# Patient Record
Sex: FEMALE | Race: WHITE | NOT HISPANIC OR LATINO | Employment: UNEMPLOYED | URBAN - METROPOLITAN AREA
[De-identification: names, ages, dates, MRNs, and addresses within clinical notes are randomized per-mention and may not be internally consistent; named-entity substitution may affect disease eponyms.]

---

## 2020-02-10 ENCOUNTER — HOSPITAL ENCOUNTER (INPATIENT)
Facility: HOSPITAL | Age: 45
LOS: 2 days | Discharge: HOME | DRG: 389 | End: 2020-02-12
Attending: FAMILY MEDICINE | Admitting: INTERNAL MEDICINE
Payer: COMMERCIAL

## 2020-02-10 DIAGNOSIS — K56.609 SBO (SMALL BOWEL OBSTRUCTION): Primary | ICD-10-CM

## 2020-02-10 DIAGNOSIS — K56.7 ILEUS: ICD-10-CM

## 2020-02-10 DIAGNOSIS — K52.9 GASTROENTERITIS, INFECTIOUS, PRESUMED: ICD-10-CM

## 2020-02-10 PROBLEM — E87.6 HYPOKALEMIA: Status: ACTIVE | Noted: 2020-02-10

## 2020-02-10 PROBLEM — G43.909 MIGRAINE HEADACHE: Status: ACTIVE | Noted: 2020-02-10

## 2020-02-10 PROBLEM — R00.0 TACHYCARDIA: Status: ACTIVE | Noted: 2020-02-10

## 2020-02-10 PROBLEM — G47.00 INSOMNIA: Status: ACTIVE | Noted: 2020-02-10

## 2020-02-10 PROBLEM — E78.5 HYPERLIPIDEMIA: Status: ACTIVE | Noted: 2020-02-10

## 2020-02-10 LAB
ALBUMIN SERPL BCP-MCNC: 4.5 G/DL (ref 3.5–5.2)
ALP SERPL-CCNC: 61 U/L (ref 55–135)
ALT SERPL W/O P-5'-P-CCNC: 17 U/L (ref 10–44)
ANION GAP SERPL CALC-SCNC: 10 MMOL/L (ref 8–16)
AST SERPL-CCNC: 17 U/L (ref 10–40)
B-HCG UR QL: NEGATIVE
BACTERIA #/AREA URNS HPF: ABNORMAL /HPF
BASOPHILS # BLD AUTO: 0.05 K/UL (ref 0–0.2)
BASOPHILS NFR BLD: 0.2 % (ref 0–1.9)
BILIRUB SERPL-MCNC: 0.9 MG/DL (ref 0.1–1)
BILIRUB UR QL STRIP: ABNORMAL
BUN SERPL-MCNC: 15 MG/DL (ref 6–20)
CALCIUM SERPL-MCNC: 8.9 MG/DL (ref 8.7–10.5)
CHLORIDE SERPL-SCNC: 108 MMOL/L (ref 95–110)
CLARITY UR: CLEAR
CO2 SERPL-SCNC: 19 MMOL/L (ref 23–29)
COLOR UR: YELLOW
CREAT SERPL-MCNC: 0.7 MG/DL (ref 0.5–1.4)
DIFFERENTIAL METHOD: ABNORMAL
EOSINOPHIL # BLD AUTO: 0.2 K/UL (ref 0–0.5)
EOSINOPHIL NFR BLD: 0.6 % (ref 0–8)
ERYTHROCYTE [DISTWIDTH] IN BLOOD BY AUTOMATED COUNT: 12.8 % (ref 11.5–14.5)
EST. GFR  (AFRICAN AMERICAN): >60 ML/MIN/1.73 M^2
EST. GFR  (NON AFRICAN AMERICAN): >60 ML/MIN/1.73 M^2
GLUCOSE SERPL-MCNC: 107 MG/DL (ref 70–110)
GLUCOSE UR QL STRIP: NEGATIVE
HCT VFR BLD AUTO: 51.3 % (ref 37–48.5)
HGB BLD-MCNC: 17.4 G/DL (ref 12–16)
HGB UR QL STRIP: ABNORMAL
HYALINE CASTS #/AREA URNS LPF: 0 /LPF
IMM GRANULOCYTES # BLD AUTO: 0.13 K/UL (ref 0–0.04)
IMM GRANULOCYTES NFR BLD AUTO: 0.6 % (ref 0–0.5)
KETONES UR QL STRIP: ABNORMAL
LACTATE SERPL-SCNC: 0.7 MMOL/L (ref 0.5–2.2)
LEUKOCYTE ESTERASE UR QL STRIP: NEGATIVE
LIPASE SERPL-CCNC: 24 U/L (ref 4–60)
LYMPHOCYTES # BLD AUTO: 2.6 K/UL (ref 1–4.8)
LYMPHOCYTES NFR BLD: 11.1 % (ref 18–48)
MCH RBC QN AUTO: 30.7 PG (ref 27–31)
MCHC RBC AUTO-ENTMCNC: 33.9 G/DL (ref 32–36)
MCV RBC AUTO: 91 FL (ref 82–98)
MICROSCOPIC COMMENT: ABNORMAL
MONOCYTES # BLD AUTO: 1.6 K/UL (ref 0.3–1)
MONOCYTES NFR BLD: 6.7 % (ref 4–15)
NEUTROPHILS # BLD AUTO: 18.9 K/UL (ref 1.8–7.7)
NEUTROPHILS NFR BLD: 80.8 % (ref 38–73)
NITRITE UR QL STRIP: NEGATIVE
NRBC BLD-RTO: 0 /100 WBC
OTHER ELEMENTS URNS MICRO: ABNORMAL
PH UR STRIP: 6 [PH] (ref 5–8)
PLATELET # BLD AUTO: 265 K/UL (ref 150–350)
PMV BLD AUTO: 10 FL (ref 9.2–12.9)
POTASSIUM SERPL-SCNC: 3.4 MMOL/L (ref 3.5–5.1)
PROT SERPL-MCNC: 7.3 G/DL (ref 6–8.4)
PROT UR QL STRIP: ABNORMAL
RBC # BLD AUTO: 5.67 M/UL (ref 4–5.4)
RBC #/AREA URNS HPF: 6 /HPF (ref 0–4)
SODIUM SERPL-SCNC: 137 MMOL/L (ref 136–145)
SP GR UR STRIP: >=1.03 (ref 1–1.03)
URN SPEC COLLECT METH UR: ABNORMAL
UROBILINOGEN UR STRIP-ACNC: NEGATIVE EU/DL
WBC # BLD AUTO: 23.34 K/UL (ref 3.9–12.7)
WBC #/AREA URNS HPF: 4 /HPF (ref 0–5)

## 2020-02-10 PROCEDURE — 96375 TX/PRO/DX INJ NEW DRUG ADDON: CPT

## 2020-02-10 PROCEDURE — 74177 CT ABD & PELVIS W/CONTRAST: CPT | Mod: TC

## 2020-02-10 PROCEDURE — 71045 X-RAY EXAM CHEST 1 VIEW: CPT | Mod: 26,,, | Performed by: RADIOLOGY

## 2020-02-10 PROCEDURE — 83690 ASSAY OF LIPASE: CPT

## 2020-02-10 PROCEDURE — 96376 TX/PRO/DX INJ SAME DRUG ADON: CPT

## 2020-02-10 PROCEDURE — 80053 COMPREHEN METABOLIC PANEL: CPT

## 2020-02-10 PROCEDURE — 96374 THER/PROPH/DIAG INJ IV PUSH: CPT

## 2020-02-10 PROCEDURE — 81000 URINALYSIS NONAUTO W/SCOPE: CPT

## 2020-02-10 PROCEDURE — 74177 CT ABD & PELVIS W/CONTRAST: CPT | Mod: 26,,, | Performed by: RADIOLOGY

## 2020-02-10 PROCEDURE — 25500020 PHARM REV CODE 255: Performed by: FAMILY MEDICINE

## 2020-02-10 PROCEDURE — 11000001 HC ACUTE MED/SURG PRIVATE ROOM

## 2020-02-10 PROCEDURE — 96361 HYDRATE IV INFUSION ADD-ON: CPT

## 2020-02-10 PROCEDURE — 71045 XR CHEST 1 VIEW FOR LINE/TUBE PLACEMENT: ICD-10-PCS | Mod: 26,,, | Performed by: RADIOLOGY

## 2020-02-10 PROCEDURE — 87040 BLOOD CULTURE FOR BACTERIA: CPT

## 2020-02-10 PROCEDURE — 83605 ASSAY OF LACTIC ACID: CPT

## 2020-02-10 PROCEDURE — 74177 CT ABDOMEN PELVIS WITH CONTRAST: ICD-10-PCS | Mod: 26,,, | Performed by: RADIOLOGY

## 2020-02-10 PROCEDURE — 85025 COMPLETE CBC W/AUTO DIFF WBC: CPT

## 2020-02-10 PROCEDURE — 81025 URINE PREGNANCY TEST: CPT

## 2020-02-10 PROCEDURE — 63600175 PHARM REV CODE 636 W HCPCS: Performed by: FAMILY MEDICINE

## 2020-02-10 PROCEDURE — 99285 EMERGENCY DEPT VISIT HI MDM: CPT | Mod: 25

## 2020-02-10 PROCEDURE — 36415 COLL VENOUS BLD VENIPUNCTURE: CPT

## 2020-02-10 RX ORDER — METRONIDAZOLE 500 MG/100ML
500 INJECTION, SOLUTION INTRAVENOUS
Status: DISCONTINUED | OUTPATIENT
Start: 2020-02-11 | End: 2020-02-11

## 2020-02-10 RX ORDER — ONDANSETRON 2 MG/ML
4 INJECTION INTRAMUSCULAR; INTRAVENOUS
Status: COMPLETED | OUTPATIENT
Start: 2020-02-10 | End: 2020-02-10

## 2020-02-10 RX ORDER — CIPROFLOXACIN 2 MG/ML
400 INJECTION, SOLUTION INTRAVENOUS
Status: DISCONTINUED | OUTPATIENT
Start: 2020-02-10 | End: 2020-02-12 | Stop reason: HOSPADM

## 2020-02-10 RX ORDER — HYDROMORPHONE HYDROCHLORIDE 2 MG/ML
0.5 INJECTION, SOLUTION INTRAMUSCULAR; INTRAVENOUS; SUBCUTANEOUS
Status: COMPLETED | OUTPATIENT
Start: 2020-02-10 | End: 2020-02-10

## 2020-02-10 RX ORDER — SODIUM CHLORIDE 9 MG/ML
INJECTION, SOLUTION INTRAVENOUS CONTINUOUS
Status: DISCONTINUED | OUTPATIENT
Start: 2020-02-10 | End: 2020-02-12 | Stop reason: HOSPADM

## 2020-02-10 RX ORDER — ONDANSETRON 2 MG/ML
4 INJECTION INTRAMUSCULAR; INTRAVENOUS EVERY 8 HOURS PRN
Status: DISCONTINUED | OUTPATIENT
Start: 2020-02-10 | End: 2020-02-10

## 2020-02-10 RX ORDER — SODIUM CHLORIDE 0.9 % (FLUSH) 0.9 %
10 SYRINGE (ML) INJECTION
Status: DISCONTINUED | OUTPATIENT
Start: 2020-02-10 | End: 2020-02-12 | Stop reason: HOSPADM

## 2020-02-10 RX ORDER — SODIUM CHLORIDE 0.9 % (FLUSH) 0.9 %
10 SYRINGE (ML) INJECTION
Status: DISCONTINUED | OUTPATIENT
Start: 2020-02-11 | End: 2020-02-12 | Stop reason: HOSPADM

## 2020-02-10 RX ORDER — LEVOTHYROXINE SODIUM 137 UG/1
125 TABLET ORAL
COMMUNITY
End: 2022-09-01 | Stop reason: DRUGHIGH

## 2020-02-10 RX ORDER — ONDANSETRON 2 MG/ML
4 INJECTION INTRAMUSCULAR; INTRAVENOUS EVERY 6 HOURS PRN
Status: DISCONTINUED | OUTPATIENT
Start: 2020-02-10 | End: 2020-02-11

## 2020-02-10 RX ORDER — METOPROLOL TARTRATE 1 MG/ML
5 INJECTION, SOLUTION INTRAVENOUS DAILY
Status: DISCONTINUED | OUTPATIENT
Start: 2020-02-11 | End: 2020-02-12 | Stop reason: HOSPADM

## 2020-02-10 RX ORDER — HYDROMORPHONE HYDROCHLORIDE 2 MG/ML
0.5 INJECTION, SOLUTION INTRAMUSCULAR; INTRAVENOUS; SUBCUTANEOUS EVERY 6 HOURS PRN
Status: DISCONTINUED | OUTPATIENT
Start: 2020-02-10 | End: 2020-02-10

## 2020-02-10 RX ORDER — HYDROMORPHONE HYDROCHLORIDE 2 MG/ML
0.5 INJECTION, SOLUTION INTRAMUSCULAR; INTRAVENOUS; SUBCUTANEOUS EVERY 4 HOURS PRN
Status: DISCONTINUED | OUTPATIENT
Start: 2020-02-11 | End: 2020-02-12 | Stop reason: HOSPADM

## 2020-02-10 RX ORDER — POTASSIUM CHLORIDE 7.45 MG/ML
10 INJECTION INTRAVENOUS
Status: DISPENSED | OUTPATIENT
Start: 2020-02-10 | End: 2020-02-11

## 2020-02-10 RX ORDER — LEVOTHYROXINE SODIUM ANHYDROUS 100 UG/5ML
100 INJECTION, POWDER, LYOPHILIZED, FOR SOLUTION INTRAVENOUS DAILY
Status: DISCONTINUED | OUTPATIENT
Start: 2020-02-11 | End: 2020-02-12 | Stop reason: HOSPADM

## 2020-02-10 RX ADMIN — HYDROMORPHONE HYDROCHLORIDE 0.5 MG: 2 INJECTION, SOLUTION INTRAMUSCULAR; INTRAVENOUS; SUBCUTANEOUS at 10:02

## 2020-02-10 RX ADMIN — SODIUM CHLORIDE 1000 ML: 9 INJECTION, SOLUTION INTRAVENOUS at 05:02

## 2020-02-10 RX ADMIN — IOHEXOL 75 ML: 350 INJECTION, SOLUTION INTRAVENOUS at 06:02

## 2020-02-10 RX ADMIN — HYDROMORPHONE HYDROCHLORIDE 0.5 MG: 2 INJECTION, SOLUTION INTRAMUSCULAR; INTRAVENOUS; SUBCUTANEOUS at 07:02

## 2020-02-10 RX ADMIN — ONDANSETRON 4 MG: 2 INJECTION INTRAMUSCULAR; INTRAVENOUS at 05:02

## 2020-02-10 RX ADMIN — HYDROMORPHONE HYDROCHLORIDE 0.5 MG: 2 INJECTION, SOLUTION INTRAMUSCULAR; INTRAVENOUS; SUBCUTANEOUS at 05:02

## 2020-02-10 RX ADMIN — SODIUM CHLORIDE: 0.9 INJECTION, SOLUTION INTRAVENOUS at 09:02

## 2020-02-10 NOTE — ED PROVIDER NOTES
Encounter Date: 2/10/2020       History     Chief Complaint   Patient presents with    Abdominal Pain     Patient complaining of abdominal pain, nausea, vomiting and diarrhea since yesterday.    Nausea    Vomiting    Diarrhea     The patient comes our facility complaining of abdominal pain.  The pain started yesterday.  It is diffuse throughout her abdomen.  She has had fevers as high as 101.9.  She also has had diarrhea.  She denies any blood per rectum.  She has had nausea with emesis.  She denies any hematemesis.  She denies any respiratory or chest complaints.        Review of patient's allergies indicates:   Allergen Reactions    Codeine      Past Medical History:   Diagnosis Date    Hypertension     Migraine headache     Thyroid disease      Past Surgical History:   Procedure Laterality Date     SECTION      ENDOMETRIAL ABLATION       History reviewed. No pertinent family history.  Social History     Tobacco Use    Smoking status: Current Every Day Smoker   Substance Use Topics    Alcohol use: Yes     Comment: occ    Drug use: Never     Review of Systems   Constitutional: Positive for chills, fatigue and fever. Negative for diaphoresis.   HENT: Negative for sinus pressure, sinus pain and sore throat.    Respiratory: Negative for cough, shortness of breath and wheezing.    Cardiovascular: Negative for chest pain, palpitations and leg swelling.   Gastrointestinal: Positive for abdominal pain, nausea and vomiting.   Genitourinary: Negative for dysuria, flank pain and frequency.   Musculoskeletal: Negative for arthralgias and myalgias.   Skin: Negative for color change, pallor, rash and wound.   Neurological: Negative for dizziness, syncope, light-headedness and numbness.   Hematological: Negative for adenopathy. Does not bruise/bleed easily.   Psychiatric/Behavioral: Negative for agitation, behavioral problems and confusion.       Physical Exam     Initial Vitals [02/10/20 1557]   BP Pulse  Resp Temp SpO2   (!) 140/98 (!) 152 20 98.3 °F (36.8 °C) 96 %      MAP       --         Physical Exam    Nursing note and vitals reviewed.  Constitutional: She appears well-developed and well-nourished. She is not diaphoretic. She appears distressed.   HENT:   Head: Normocephalic and atraumatic.   Nose: Nose normal.   Mouth/Throat: Oropharynx is clear and moist.   Eyes: Conjunctivae and EOM are normal. Right eye exhibits no discharge. Left eye exhibits no discharge. No scleral icterus.   Neck: Normal range of motion. Neck supple. No thyromegaly present.   Cardiovascular: Normal rate, regular rhythm, normal heart sounds and intact distal pulses.   No murmur heard.  Pulmonary/Chest: Breath sounds normal. No respiratory distress. She has no wheezes. She has no rales. She exhibits no tenderness.   Abdominal: Soft. Bowel sounds are normal. She exhibits no distension and no mass. There is tenderness. There is guarding. There is no rebound.   Musculoskeletal: Normal range of motion. She exhibits no edema or tenderness.   Lymphadenopathy:     She has no cervical adenopathy.   Neurological: She is alert and oriented to person, place, and time. No cranial nerve deficit or sensory deficit. GCS score is 15. GCS eye subscore is 4. GCS verbal subscore is 5. GCS motor subscore is 6.   Skin: Skin is warm and dry. Capillary refill takes less than 2 seconds. No rash and no abscess noted. No erythema. No pallor.   Psychiatric: She has a normal mood and affect. Her behavior is normal. Judgment and thought content normal.         ED Course   Procedures  Labs Reviewed   URINALYSIS, REFLEX TO URINE CULTURE - Abnormal; Notable for the following components:       Result Value    Specific Gravity, UA >=1.030 (*)     Protein, UA 1+ (*)     Bilirubin (UA) 2+ (*)     Occult Blood UA Trace (*)     All other components within normal limits    Narrative:     Preferred Collection Type->Urine, Clean Catch   CBC W/ AUTO DIFFERENTIAL - Abnormal;  Notable for the following components:    WBC 23.34 (*)     RBC 5.67 (*)     Hemoglobin 17.4 (*)     Hematocrit 51.3 (*)     Immature Granulocytes 0.6 (*)     Gran # (ANC) 18.9 (*)     Immature Grans (Abs) 0.13 (*)     Mono # 1.6 (*)     Gran% 80.8 (*)     Lymph% 11.1 (*)     All other components within normal limits   COMPREHENSIVE METABOLIC PANEL - Abnormal; Notable for the following components:    Potassium 3.4 (*)     CO2 19 (*)     All other components within normal limits   URINALYSIS MICROSCOPIC - Abnormal; Notable for the following components:    RBC, UA 6 (*)     Bacteria Few (*)     Other (U/A) Moderate (*)     All other components within normal limits    Narrative:     Preferred Collection Type->Urine, Clean Catch   PREGNANCY TEST, URINE RAPID          Imaging Results    None                                       patient's case was transitions to Dr. Pizano at 6:20 p.m. on 02/10/2020.    Clinical Impression:       ICD-10-CM ICD-9-CM   1. SBO (small bowel obstruction) K56.609 560.9   2. Gastroenteritis, infectious, presumed K52.9 009.1   3. Ileus K56.7 560.1     Diagnosis, treatment, and disposition is per Dr. Jerica Barraza MD  02/20/20 0338

## 2020-02-11 LAB
ALBUMIN SERPL BCP-MCNC: 3.4 G/DL (ref 3.5–5.2)
ALP SERPL-CCNC: 47 U/L (ref 55–135)
ALT SERPL W/O P-5'-P-CCNC: 12 U/L (ref 10–44)
ANION GAP SERPL CALC-SCNC: 7 MMOL/L (ref 8–16)
AST SERPL-CCNC: 11 U/L (ref 10–40)
BASOPHILS # BLD AUTO: 0.02 K/UL (ref 0–0.2)
BASOPHILS NFR BLD: 0.2 % (ref 0–1.9)
BILIRUB SERPL-MCNC: 0.5 MG/DL (ref 0.1–1)
BUN SERPL-MCNC: 12 MG/DL (ref 6–20)
CALCIUM SERPL-MCNC: 8 MG/DL (ref 8.7–10.5)
CHLORIDE SERPL-SCNC: 111 MMOL/L (ref 95–110)
CO2 SERPL-SCNC: 21 MMOL/L (ref 23–29)
CREAT SERPL-MCNC: 0.5 MG/DL (ref 0.5–1.4)
DIFFERENTIAL METHOD: ABNORMAL
EOSINOPHIL # BLD AUTO: 0.3 K/UL (ref 0–0.5)
EOSINOPHIL NFR BLD: 2.6 % (ref 0–8)
ERYTHROCYTE [DISTWIDTH] IN BLOOD BY AUTOMATED COUNT: 12.9 % (ref 11.5–14.5)
EST. GFR  (AFRICAN AMERICAN): >60 ML/MIN/1.73 M^2
EST. GFR  (NON AFRICAN AMERICAN): >60 ML/MIN/1.73 M^2
GLUCOSE SERPL-MCNC: 99 MG/DL (ref 70–110)
HCT VFR BLD AUTO: 44.5 % (ref 37–48.5)
HGB BLD-MCNC: 14.9 G/DL (ref 12–16)
IMM GRANULOCYTES # BLD AUTO: 0.03 K/UL (ref 0–0.04)
IMM GRANULOCYTES NFR BLD AUTO: 0.3 % (ref 0–0.5)
LYMPHOCYTES # BLD AUTO: 1.2 K/UL (ref 1–4.8)
LYMPHOCYTES NFR BLD: 12.4 % (ref 18–48)
MAGNESIUM SERPL-MCNC: 1.7 MG/DL (ref 1.6–2.6)
MCH RBC QN AUTO: 31 PG (ref 27–31)
MCHC RBC AUTO-ENTMCNC: 33.5 G/DL (ref 32–36)
MCV RBC AUTO: 93 FL (ref 82–98)
MONOCYTES # BLD AUTO: 0.9 K/UL (ref 0.3–1)
MONOCYTES NFR BLD: 9.4 % (ref 4–15)
NEUTROPHILS # BLD AUTO: 7.2 K/UL (ref 1.8–7.7)
NEUTROPHILS NFR BLD: 75.1 % (ref 38–73)
NRBC BLD-RTO: 0 /100 WBC
PHOSPHATE SERPL-MCNC: 2.7 MG/DL (ref 2.7–4.5)
PLATELET # BLD AUTO: 172 K/UL (ref 150–350)
PMV BLD AUTO: 10.3 FL (ref 9.2–12.9)
POTASSIUM SERPL-SCNC: 3.9 MMOL/L (ref 3.5–5.1)
PROT SERPL-MCNC: 5.7 G/DL (ref 6–8.4)
RBC # BLD AUTO: 4.81 M/UL (ref 4–5.4)
SODIUM SERPL-SCNC: 139 MMOL/L (ref 136–145)
WBC # BLD AUTO: 9.56 K/UL (ref 3.9–12.7)

## 2020-02-11 PROCEDURE — 99223 PR INITIAL HOSPITAL CARE,LEVL III: ICD-10-PCS | Mod: ,,, | Performed by: SURGERY

## 2020-02-11 PROCEDURE — 25000003 PHARM REV CODE 250: Performed by: HOSPITALIST

## 2020-02-11 PROCEDURE — 21400001 HC TELEMETRY ROOM

## 2020-02-11 PROCEDURE — 99233 PR SUBSEQUENT HOSPITAL CARE,LEVL III: ICD-10-PCS | Mod: ,,, | Performed by: INTERNAL MEDICINE

## 2020-02-11 PROCEDURE — S0030 INJECTION, METRONIDAZOLE: HCPCS | Performed by: INTERNAL MEDICINE

## 2020-02-11 PROCEDURE — S0030 INJECTION, METRONIDAZOLE: HCPCS | Performed by: HOSPITALIST

## 2020-02-11 PROCEDURE — 84100 ASSAY OF PHOSPHORUS: CPT

## 2020-02-11 PROCEDURE — 80053 COMPREHEN METABOLIC PANEL: CPT

## 2020-02-11 PROCEDURE — 63600175 PHARM REV CODE 636 W HCPCS

## 2020-02-11 PROCEDURE — 63600175 PHARM REV CODE 636 W HCPCS: Performed by: FAMILY MEDICINE

## 2020-02-11 PROCEDURE — 25000003 PHARM REV CODE 250: Performed by: INTERNAL MEDICINE

## 2020-02-11 PROCEDURE — 63600175 PHARM REV CODE 636 W HCPCS: Performed by: INTERNAL MEDICINE

## 2020-02-11 PROCEDURE — 85025 COMPLETE CBC W/AUTO DIFF WBC: CPT

## 2020-02-11 PROCEDURE — 83735 ASSAY OF MAGNESIUM: CPT

## 2020-02-11 PROCEDURE — 63600175 PHARM REV CODE 636 W HCPCS: Performed by: HOSPITALIST

## 2020-02-11 PROCEDURE — 36415 COLL VENOUS BLD VENIPUNCTURE: CPT

## 2020-02-11 PROCEDURE — 11000001 HC ACUTE MED/SURG PRIVATE ROOM

## 2020-02-11 PROCEDURE — 99223 1ST HOSP IP/OBS HIGH 75: CPT | Mod: ,,, | Performed by: SURGERY

## 2020-02-11 PROCEDURE — 99233 SBSQ HOSP IP/OBS HIGH 50: CPT | Mod: ,,, | Performed by: INTERNAL MEDICINE

## 2020-02-11 RX ORDER — POTASSIUM CHLORIDE 7.45 MG/ML
INJECTION INTRAVENOUS
Status: COMPLETED
Start: 2020-02-11 | End: 2020-02-11

## 2020-02-11 RX ORDER — METRONIDAZOLE 500 MG/100ML
500 INJECTION, SOLUTION INTRAVENOUS
Status: DISCONTINUED | OUTPATIENT
Start: 2020-02-11 | End: 2020-02-12 | Stop reason: HOSPADM

## 2020-02-11 RX ORDER — ACETAMINOPHEN 325 MG/1
650 TABLET ORAL EVERY 6 HOURS PRN
Status: DISCONTINUED | OUTPATIENT
Start: 2020-02-11 | End: 2020-02-12 | Stop reason: HOSPADM

## 2020-02-11 RX ORDER — POTASSIUM CHLORIDE 7.45 MG/ML
10 INJECTION INTRAVENOUS
Status: DISCONTINUED | OUTPATIENT
Start: 2020-02-11 | End: 2020-02-11

## 2020-02-11 RX ORDER — POTASSIUM CHLORIDE 7.45 MG/ML
10 INJECTION INTRAVENOUS
Status: ACTIVE | OUTPATIENT
Start: 2020-02-11 | End: 2020-02-11

## 2020-02-11 RX ORDER — ONDANSETRON 2 MG/ML
4 INJECTION INTRAMUSCULAR; INTRAVENOUS EVERY 4 HOURS PRN
Status: DISCONTINUED | OUTPATIENT
Start: 2020-02-11 | End: 2020-02-12 | Stop reason: HOSPADM

## 2020-02-11 RX ADMIN — HYDROMORPHONE HYDROCHLORIDE 0.5 MG: 2 INJECTION, SOLUTION INTRAMUSCULAR; INTRAVENOUS; SUBCUTANEOUS at 03:02

## 2020-02-11 RX ADMIN — HYDROMORPHONE HYDROCHLORIDE 0.5 MG: 2 INJECTION, SOLUTION INTRAMUSCULAR; INTRAVENOUS; SUBCUTANEOUS at 11:02

## 2020-02-11 RX ADMIN — HYDROMORPHONE HYDROCHLORIDE 0.5 MG: 2 INJECTION, SOLUTION INTRAMUSCULAR; INTRAVENOUS; SUBCUTANEOUS at 07:02

## 2020-02-11 RX ADMIN — POTASSIUM CHLORIDE 10 MEQ: 10 INJECTION, SOLUTION INTRAVENOUS at 06:02

## 2020-02-11 RX ADMIN — CIPROFLOXACIN 400 MG: 2 INJECTION, SOLUTION INTRAVENOUS at 11:02

## 2020-02-11 RX ADMIN — METRONIDAZOLE 500 MG: 500 INJECTION, SOLUTION INTRAVENOUS at 05:02

## 2020-02-11 RX ADMIN — SODIUM CHLORIDE: 0.9 INJECTION, SOLUTION INTRAVENOUS at 06:02

## 2020-02-11 RX ADMIN — POTASSIUM CHLORIDE 10 MEQ: 10 INJECTION, SOLUTION INTRAVENOUS at 02:02

## 2020-02-11 RX ADMIN — ONDANSETRON 4 MG: 2 INJECTION INTRAMUSCULAR; INTRAVENOUS at 04:02

## 2020-02-11 RX ADMIN — ONDANSETRON 4 MG: 2 INJECTION INTRAMUSCULAR; INTRAVENOUS at 11:02

## 2020-02-11 RX ADMIN — SODIUM CHLORIDE 1000 ML: 0.9 INJECTION, SOLUTION INTRAVENOUS at 03:02

## 2020-02-11 RX ADMIN — ONDANSETRON 4 MG: 2 INJECTION INTRAMUSCULAR; INTRAVENOUS at 07:02

## 2020-02-11 RX ADMIN — POTASSIUM CHLORIDE 10 MEQ: 10 INJECTION, SOLUTION INTRAVENOUS at 12:02

## 2020-02-11 RX ADMIN — METRONIDAZOLE 500 MG: 500 INJECTION, SOLUTION INTRAVENOUS at 09:02

## 2020-02-11 RX ADMIN — ONDANSETRON 4 MG: 2 INJECTION INTRAMUSCULAR; INTRAVENOUS at 01:02

## 2020-02-11 RX ADMIN — ACETAMINOPHEN 650 MG: 325 TABLET ORAL at 11:02

## 2020-02-11 RX ADMIN — LEVOTHYROXINE SODIUM ANHYDROUS 100 MCG: 100 INJECTION, POWDER, LYOPHILIZED, FOR SOLUTION INTRAVENOUS at 09:02

## 2020-02-11 RX ADMIN — METRONIDAZOLE 500 MG: 500 INJECTION, SOLUTION INTRAVENOUS at 02:02

## 2020-02-11 RX ADMIN — ACETAMINOPHEN 650 MG: 325 TABLET ORAL at 06:02

## 2020-02-11 RX ADMIN — CIPROFLOXACIN 400 MG: 2 INJECTION, SOLUTION INTRAVENOUS at 12:02

## 2020-02-11 NOTE — ED NOTES
Patient lying supine on stretcher. No acute distress noted. Patient asks for water, Dr. Pizano does not allow this at this time. Patient informed.

## 2020-02-11 NOTE — CONSULTS
[facility] - Med Surg  General Surgery  Consult Note  2020    Patient Name: Helen Freeman  MRN: [id number]  Admission Date: 2/10/2020        REASON FOR CONSULT:  Possible small bowel obstruction    HISTORY:  Ms. Freeman was admitted to the emergency room yesterday.  She presented the ER with complaints of generalized crampy abdominal pain, nausea vomiting, and diarrhea.  Her symptoms began one day prior to presentation.  Objective fever with a maximum temperature of 101.9° F. No melena, hematochezia, hematemesis.  No rigors.  No night sweats.  No recent weight loss.  No recent exposure to acutely ill individuals.  No aggravating or alleviating factors.  No other associated symptoms.  No dysuria, urgency, frequency, hesitancy, nocturia, hematuria.  No cough, chest pain, shortness of breath, palpitations, diaphoresis, dyspnea, aspiration.  No vaginal discharge.  Since admission the hospital nasogastric tube was inserted in the emergency room. She is passing flatus.  Her abdominal pain has nearly resolved.  Nausea has near really resolved.  Diarrhea and vomiting have resolved.  She is passing flatus.  She has been afebrile since admission with excellent vital signs throughout.  Tachycardia on admission has resolved.  Urine output has improved.  Nasogastric tube has evacuated only 650 cc of bile stain gastric contents.    PAST MEDICAL HISTORY:  Multiple  sections.  Endometrial ablation.  Hypertension.  Migraines.  Hypothyroidism.  Hyperlipidemia.    ALLERGIES:  Codeine    HOME MEDICATIONS:  Levothyroxine.  Metoprolol.  Pravastatin.  Topamax.  Trazodone.    HOSPITAL MEDICATIONS:  Cipro.  Dilaudid.  Levothyroxine.  Metoprolol.  Flagyl.  Zofran.    SOCIAL HISTORY:  Active smoker, 25 pack year history.  Social consumer of alcohol.  No history of alcohol dependence of withdrawal.  No illegal or recreational drug use.  No history of alcohol or drug treatment.    FAMILY HISTORY:   Noncontributory.  No family history of any anesthetic complications, bleeding disorders, inflammatory bowel disease, gastrointestinal malignancies, biliary disease, or ulcer disease.    ROS:  As above otherwise all 14 systems entirely negative    Physical Exam:   Gen.-normotensive, normocardic, comfortable, no acute distress, GCS 15.   HEENT-normocephalic, atraumatic, sclerae are anicteric, pupils equal round reactive to light, conjunctiva clear, nares patent without discharge, dentition fair, no oral lesions, oropharynx clear without exudates, mucous membranes moist.  Nasogastric tube in place draining clear bile stained gastric contents  Neck-nontender, full range of motion, no JVD, no bruits, no masses, no thyromegaly.   Cardiovascular-regular rate and rhythm, PMI nondisplaced, tones normal, no gallops, no rubs, no murmurs.  Intact distal pulses throughout.  No peripheral edema.  No bruits or thrills.  Good perfusion.   Pulmonary-clear to auscultation, no respiratory distress, no wheezes, no rales, no rhonchi, good full symmetrical excursion, no accessory muscle use, no retractions.   Abdomen-soft, nontender, nondistended, hypoactive normopitched bowel sounds, no masses, no ventral hernias, no inguinal hernias, no bruits, no hepatosplenomegaly, no guarding, no rebound.  Negative heel jolt, psoas, obturator, and Bustos signs.  No percussion, referred, or CVA tenderness.  -normal external genitalia, no lesions, no discharge, no masses.  Rectal-no masses, normal sphincter tone, nontender, Hemoccult negative.    Extremities/Musculoskeletal-no clubbing, no cyanosis, no edema, no gross deformities.  Integument-no rashes, no lesions, no jaundice, no induration, no decubiti.    Lymphatic-no adenopathy - cervical, supraclavicular, axillary, or inguinal.    Psych-mental status intact, no hallucinations, no suicidal ideations, no homicidal ideations, alert, appropriate, oriented to person, time, and place.  Neuro-no gross  cranial nerve deficits, no gross motor deficits, no evident sensory deficits, no incoordination, no tremors    LAB RESULTS:  Leukocytosis on admission has now resolved.  Mild left shift on admission has improved.  No anemia.  No thrombocytopenia.  Polycythemia present on admission has resolved with intravenous fluids indicating hemoconcentration/dehydration on admission now corrected.  Electrolytes reveal a mild hyperchloremia, hypocalcemia, and hypocarbia.  Hypocarbia has improved since admission.  BUN and creatinine shows no evidence of renal dysfunction.  Euglycemic.  No evidence of hepatocellular disease or biliary obstruction. Urinalysis on admission without gross evidence of UTI.    RADIOLOGY RESULTS:  CT scan of the abdomen pelvis with intravenous contrast but without oral contrast films and report reviewed.  She has some dilated fluid-filled loops of small bowel proximal but without gastric distension.  There is a suspected mid abdominal transition zone but not a true transition point with some possible mild circumferential wall thickening.  No other abnormalities or pathology detected.  Chest x-ray shows good position of nasogastric tube and no acute cardiopulmonary pathology. Both the studies were completed yesterday.  Films and report for both studies reviewed.  Flat and erect abdominal x-rays today confirm the NG tube is in good position, no evidence of a small-bowel obstruction, nondistended colon with gas and stool, no small-bowel gas for dilation.    CARDIOVASCULAR STUDIES:  Not applicable    ASSESSMENT:  Probable resolved ileus or resolving infectious gastroenteritis.  No evidence of a small-bowel obstruction. No risk factors associated with small-bowel obstruction.    RECOMMENDATIONS:  Discontinue NG tube. Continue supportive care and intravenous fluids.  Continue intravenous antibiotics.  Advanced to clear liquid diet tomorrow morning if continues to do well. Further management and recommendations  will depend upon clinical course.      Carlitos Olea MD  2/11/2020

## 2020-02-11 NOTE — NURSING
Pt vale cath removed w/o incident.  10cc of fluid removed from bulb.  Pt instructed to let staff know when she has the urge to urinate.  Bed alarm activated.

## 2020-02-11 NOTE — PLAN OF CARE
02/11/20 1709   Discharge Assessment   Assessment Type Discharge Planning Assessment   Confirmed/corrected address and phone number on facesheet? Yes   Assessment information obtained from? Patient   Prior to hospitalization functional status: Independent   Current cognitive status: Alert/Oriented   Current Functional Status: Needs Assistance   Facility Arrived From: HOME   Lives With child(sandor), adult;spouse   Able to Return to Prior Arrangements yes   Is patient able to care for self after discharge? Yes   Who are your caregiver(s) and their phone number(s)?  JUSTINA HERMAN [phone]   Patient's perception of discharge disposition home or selfcare   Readmission Within the Last 30 Days no previous admission in last 30 days   Patient currently being followed by outpatient case management? No   Patient currently receives any other outside agency services? No   Equipment Currently Used at Home none   Do you have any problems affording any of your prescribed medications? No   Is the patient taking medications as prescribed? yes   Does the patient have transportation home? Yes   Transportation Anticipated family or friend will provide   Dialysis Name and Scheduled days N/A   Does the patient receive services at the [facility]mad[facility] Clinic? No   Discharge Plan A Home with family   DME Needed Upon Discharge  none   Patient/Family in Agreement with Plan yes   PT LIVES IN [place] WITH HER  AND 15Y/O DAUGHTER. SHE APPEARS ALERT AND ORIENTED AND IS INDEPENDENT WITH ADL'S PRIOR TO ADMISSION. SHE HAS A BOWEL OBSTRUCTION. SW WILL FOLLOW PT AND ASSIST AS NEEDED.

## 2020-02-11 NOTE — ASSESSMENT & PLAN NOTE
Patient reports taking metoprolol succinate for a history of tachycardia  Cannot take PO right now   Will switch to 5 mg IV qam until she is able to tolerate PO  Telemetry

## 2020-02-11 NOTE — ASSESSMENT & PLAN NOTE
Presenting with abdominal pain, nausea, vomiting, and diarrhea  CT abdomen & pelvis showed: Stomach, bowel, mesentery; normal appearance of the appendix.  Dilated fluid-filled proximal small bowel loops.  Distal small bowel and colon not dilated.  Transition zone is thought to be seen in the mid abdomen with there is also mild circumferential wall thickening. Findings suggesting and consistent with small bowel obstruction.  Transition zone is thought to be visualized in the mid abdomen.  Case was discussed with general surgery in the ER and will consult in AM: consult placed  NGT in place to low intermittent suction  IVF, low dose dilaudid, and zofran PRN   Given leukocytosis, will start cipro and metronidazole for possible GI source; Hb also appears elevated and she could be hemo-concentrated but given the reported fever at home, will err on the side of caution and start above antibiotics   Stool culture   Electrolyte repletion PRN   Abdomen soft and not significantly tender   Otherwise stable     02/11/2020:    Continue current treatment with bowel rest and symptomatic control  Repeat labs in the a.m. to include CBC and CMP  Await surgical consult.  Follow-up otherwise as clinically indicated based on course

## 2020-02-11 NOTE — HPI
The patient is a 43 y/o female with PMH of hypothyroidism, HLP, migraine headaches, and insomnia. She presents with a two day history of diffuse abdominal pain, nausea, vomiting, and diarrhea. She has had an associated fever to 101.9 as well. She was in her usual state of health prior to this. She was found to have a SBO on CT abd done in the ER. Patient reports feeling unwell but also reports this is partially due to discomfort from the NGT that was placed as well. She denies any chills, blood in her diarrhea, recent travel, dysuria, or other symptoms.

## 2020-02-11 NOTE — H&P
[facility] - Med Surg Hospital Medicine  History & Physical      The patient location is: [facility]  The chief complaint leading to consultation is: SBO  Visit type: Virtual visit with synchronous audio and video  Total time spent with patient: 15 mins   Each patient to whom he or she provides medical services by telemedicine is:  (1) informed of the relationship between the physician and patient and the respective role of any other health care provider with respect to management of the patient; and (2) notified that he or she may decline to receive medical services by telemedicine and may withdraw from such care at any time.        Patient Name: Helen Freeman  MRN: [id number]  Admission Date: 2/10/2020  Attending Physician: Avery Hedrick MD   Primary Care Provider: Suyapa Zarate MD         Patient information was obtained from patient and ER records.     Subjective:     Principal Problem:SBO (small bowel obstruction)    Chief Complaint:   Chief Complaint   Patient presents with    Abdominal Pain     Patient complaining of abdominal pain, nausea, vomiting and diarrhea since yesterday.    Nausea    Vomiting    Diarrhea        HPI: The patient is a 43 y/o female with PMH of hypothyroidism, HLP, migraine headaches, and insomnia. She presents with a two day history of diffuse abdominal pain, nausea, vomiting, and diarrhea. She has had an associated fever to 101.9 as well. She was in her usual state of health prior to this. She was found to have a SBO on CT abd done in the ER. Patient reports feeling unwell but also reports this is partially due to discomfort from the NGT that was placed as well. She denies any chills, blood in her diarrhea, recent travel, dysuria, or other symptoms.             Past Medical History:   Diagnosis Date    Hypertension     Migraine headache     Thyroid disease        Past Surgical History:   Procedure Laterality Date     SECTION       ENDOMETRIAL ABLATION         Review of patient's allergies indicates:   Allergen Reactions    Codeine        No current facility-administered medications on file prior to encounter.      Current Outpatient Medications on File Prior to Encounter   Medication Sig    levothyroxine (SYNTHROID) 137 MCG Tab tablet Take by mouth before breakfast.    METOPROLOL SUCCINATE ORAL Take 25 mg by mouth once daily.     pravastatin sodium (PRAVASTATIN ORAL) Take 40 mg by mouth every evening.     topiramate (TOPAMAX ORAL) Take 50 mg by mouth 2 (two) times daily.     trazodone HCl (TRAZODONE ORAL) Take 25 mg by mouth every evening.      Family History     None        Tobacco Use    Smoking status: Current Every Day Smoker   Substance and Sexual Activity    Alcohol use: Yes     Comment: occ    Drug use: Never    Sexual activity: Yes     Birth control/protection: See Surgical Hx     Review of Systems   Constitutional: Positive for fatigue and fever. Negative for chills.   HENT: Negative for congestion, rhinorrhea, sinus pressure, sinus pain and sore throat.    Eyes: Negative for visual disturbance.   Respiratory: Negative for cough and shortness of breath.    Cardiovascular: Negative for chest pain and palpitations.   Gastrointestinal:        Per HPI   Genitourinary: Negative for difficulty urinating, dysuria, frequency and hematuria.   Musculoskeletal: Negative.    Skin: Negative for rash.   Neurological: Negative for dizziness, light-headedness and headaches.   Hematological: Does not bruise/bleed easily.   Psychiatric/Behavioral: Negative for hallucinations. The patient is not nervous/anxious.      Objective:     Vital Signs (Most Recent):  Temp: 97.5 °F (36.4 °C) (02/10/20 2314)  Pulse: 76 (02/10/20 2314)  Resp: 17 (02/10/20 2314)  BP: (!) 108/57 (02/10/20 2314)  SpO2: 95 % (02/10/20 2314) Vital Signs (24h Range):  Temp:  [96.1 °F (35.6 °C)-98.3 °F (36.8 °C)] 97.5 °F (36.4 °C)  Pulse:  [] 76  Resp:  [16-20] 17  SpO2:   [95 %-98 %] 95 %  BP: (105-140)/(57-98) 108/57     Weight: 79 kg (174 lb 2.6 oz)  Body mass index is 28.98 kg/m².    Physical Exam   Constitutional: She is oriented to person, place, and time. She appears well-developed and well-nourished. No distress.   HENT:   + NGT    Cardiovascular: Normal rate.   Pulmonary/Chest: Effort normal. No respiratory distress.   Abdominal: Soft.   abd soft on nursing palpation  No significant tenderness noted   Musculoskeletal: She exhibits no edema.   Neurological: She is alert and oriented to person, place, and time.   Vitals reviewed.          Significant Labs: All pertinent labs within the past 24 hours have been reviewed.    Significant Imaging: I have reviewed all pertinent imaging results/findings within the past 24 hours.    Assessment/Plan:     * SBO (small bowel obstruction)  Presenting with abdominal pain, nausea, vomiting, and diarrhea  CT abdomen & pelvis showed: Stomach, bowel, mesentery; normal appearance of the appendix.  Dilated fluid-filled proximal small bowel loops.  Distal small bowel and colon not dilated.  Transition zone is thought to be seen in the mid abdomen with there is also mild circumferential wall thickening. Findings suggesting and consistent with small bowel obstruction.  Transition zone is thought to be visualized in the mid abdomen.  Case was discussed with general surgery in the ER and will consult in AM: consult placed  NGT in place to low intermittent suction  IVF, low dose dilaudid, and zofran PRN   Given leukocytosis, will start cipro and metronidazole for possible GI source; Hb also appears elevated and she could be hemo-concentrated but given the reported fever at home, will err on the side of caution and start above antibiotics   Stool culture   Electrolyte repletion PRN   Abdomen soft and not significantly tender   Otherwise stable         Tachycardia  Patient reports taking metoprolol succinate for a history of tachycardia  Cannot take PO  right now   Will switch to 5 mg IV qam until she is able to tolerate PO  Telemetry         Hyperlipidemia  Resume pravastatin qhs when able to tolerate PO       Insomnia  Resume trazodone QHS when able to tolerate PO       Hypokalemia  Replace IV      Migraine headache  Hold topamax while NPO  Resume when able to tolerate PO         VTE Risk Mitigation (From admission, onward)         Ordered     Place ESTUARDO hose  Until discontinued      02/10/20 2309     Place sequential compression device  Until discontinued      02/10/20 2309                   Jeff Correa MD  Department of Hospital Medicine   [facility] - Med Surg

## 2020-02-11 NOTE — SUBJECTIVE & OBJECTIVE
Interval History:     Review of Systems   Constitutional: Negative for activity change, appetite change, fatigue and fever.   HENT: Negative for congestion, ear discharge, mouth sores, nosebleeds, rhinorrhea, sinus pressure, sinus pain and tinnitus.    Eyes: Negative.  Negative for pain, redness and itching.   Respiratory: Negative for apnea, cough, choking, chest tightness, shortness of breath, wheezing and stridor.    Cardiovascular: Negative for chest pain, palpitations and leg swelling.   Gastrointestinal: Positive for abdominal distention, abdominal pain, diarrhea, nausea and vomiting. Negative for anal bleeding, blood in stool and constipation.   Endocrine: Negative.    Genitourinary: Negative for difficulty urinating, flank pain, frequency and urgency.   Musculoskeletal: Negative for back pain, gait problem and myalgias.   Skin: Negative for color change and pallor.   Allergic/Immunologic: Negative.    Neurological: Negative for dizziness, facial asymmetry, weakness, light-headedness and headaches.   Hematological: Negative for adenopathy. Does not bruise/bleed easily.   Psychiatric/Behavioral: The patient is nervous/anxious.      Objective:     Vital Signs (Most Recent):  Temp: 98.3 °F (36.8 °C) (02/11/20 1030)  Pulse: 82 (02/11/20 1030)  Resp: 16 (02/11/20 1030)  BP: 113/62 (02/11/20 1030)  SpO2: 98 % (02/11/20 1030) Vital Signs (24h Range):  Temp:  [96.1 °F (35.6 °C)-98.3 °F (36.8 °C)] 98.3 °F (36.8 °C)  Pulse:  [] 82  Resp:  [14-17] 16  SpO2:  [95 %-98 %] 98 %  BP: (105-134)/(57-90) 113/62     Weight: 79 kg (174 lb 2.6 oz)  Body mass index is 28.98 kg/m².    Intake/Output Summary (Last 24 hours) at 2/11/2020 1625  Last data filed at 2/11/2020 1621  Gross per 24 hour   Intake 3257.5 ml   Output 2300 ml   Net 957.5 ml      Physical Exam   Constitutional: She is oriented to person, place, and time. She appears well-developed and well-nourished.   HENT:   Head: Normocephalic and atraumatic.   Eyes:  Pupils are equal, round, and reactive to light. EOM are normal.   Neck: Normal range of motion. Neck supple. No tracheal deviation present. No thyromegaly present.   Cardiovascular: Normal rate, regular rhythm and normal heart sounds.   Pulmonary/Chest: Effort normal and breath sounds normal.   Abdominal: Soft. Bowel sounds are normal. She exhibits distension. There is tenderness. There is guarding. There is no rebound.   Musculoskeletal: Normal range of motion.   Lymphadenopathy:     She has no cervical adenopathy.   Neurological: She is alert and oriented to person, place, and time.   Skin: Skin is warm and dry. Capillary refill takes less than 2 seconds.   Psychiatric: She has a normal mood and affect. Her behavior is normal. Judgment and thought content normal.   Nursing note and vitals reviewed.      Significant Labs:   Recent Lab Results       02/11/20  0558   02/10/20  1722   02/10/20  1719        Albumin 3.4         Alkaline Phosphatase 47         ALT 12         Anion Gap 7         AST 11         Baso # 0.02         Basophil% 0.2         BILIRUBIN TOTAL 0.5  Comment:  For infants and newborns, interpretation of results should be based  on gestational age, weight and in agreement with clinical  observations.  Premature Infant recommended reference ranges:  Up to 24 hours.............<8.0 mg/dL  Up to 48 hours............<12.0 mg/dL  3-5 days..................<15.0 mg/dL  6-29 days.................<15.0 mg/dL           Blood Culture, Routine   No Growth to date[P] No Growth to date[P]     BUN, Bld 12         Calcium 8.0         Chloride 111         CO2 21         Creatinine 0.5         Differential Method Automated         eGFR if  >60.0         eGFR if non  >60.0  Comment:  Calculation used to obtain the estimated glomerular filtration  rate (eGFR) is the CKD-EPI equation.            Eos # 0.3         Eosinophil% 2.6         Glucose 99         Gran # (ANC) 7.2         Gran%  75.1         Hematocrit 44.5         Hemoglobin 14.9         Immature Grans (Abs) 0.03  Comment:  Mild elevation in immature granulocytes is non specific and   can be seen in a variety of conditions including stress response,   acute inflammation, trauma and pregnancy. Correlation with other   laboratory and clinical findings is essential.           Immature Granulocytes 0.3         Lactate, Vickey     0.7  Comment:  Falsely low lactic acid results can be found in samples   containing >=13.0 mg/dL total bilirubin and/or >=3.5 mg/dL   direct bilirubin.       Lymph # 1.2         Lymph% 12.4         Magnesium 1.7         MCH 31.0         MCHC 33.5         MCV 93         Mono # 0.9         Mono% 9.4         MPV 10.3         nRBC 0         Phosphorus 2.7         Platelets 172         Potassium 3.9         PROTEIN TOTAL 5.7         RBC 4.81         RDW 12.9         Sodium 139         WBC 9.56             All pertinent labs within the past 24 hours have been reviewed.    Significant Imaging: I have reviewed and interpreted all pertinent imaging results/findings within the past 24 hours.

## 2020-02-11 NOTE — HOSPITAL COURSE
Patient is a 44-year-old female that presented last evening complaining of 2 day history of nausea vomiting and diarrhea.  Patient had a fever of 101.9.  Patient was admitted for bowel rest antibiotics and symptomatic control.  General surgery was consulted for today.  Labs this morning showed a white blood cell count 9.5 hemoglobin 14.9 hematocrit 44.5 platelets 172 and differential 75 granulocytes 12 lymphocytes.  Sodium 139 potassium 3.9 chloride 111 bicarb 21 calcium 8.0 total protein 5.7 albumin 3.4.  Patient states that she still has abdominal pain with tenderness to palpation in the mid abdomen radiating to her back bilaterally. Plain film x-ray abdomen flat and upright will be done this morning to compare to previous CT done last evening that showed a mid small-bowel obstruction by radiology report.    02/12/2020:  Patient was seen by Dr. Olea, general surgery.  Patient did not seem to have obstruction by plain film the morning after admission.  Dr. Olea is pending was this patient had a gastroenteritis.  Patient has improved with antibiotic therapy and conservative treatment.  Patient states she feels better and has tolerated clear liquid diet.  Labs this morning revealed phosphorus 2.4 magnesium 1.4 is potassium 3.4 bicarb 22 glucose 74 and BUN creatinine were 9 and 0.6.  White blood cell count was completely normal with platelets 146 and normal differential.  Blood cultures are negative so far.  Patient will have magnesium and potassium repleted prior to discharge. However feel as patient could be discharged home and will continue ciprofloxacin 500 mg p.o. b.i.d. for 10 days and metronidazole 500 mg t.i.d. for 10 days.  Patient should follow with her primary care physician within 1 week and return if worsening.

## 2020-02-11 NOTE — SUBJECTIVE & OBJECTIVE
Past Medical History:   Diagnosis Date    Hypertension     Migraine headache     Thyroid disease        Past Surgical History:   Procedure Laterality Date     SECTION      ENDOMETRIAL ABLATION         Review of patient's allergies indicates:   Allergen Reactions    Codeine        No current facility-administered medications on file prior to encounter.      Current Outpatient Medications on File Prior to Encounter   Medication Sig    levothyroxine (SYNTHROID) 137 MCG Tab tablet Take by mouth before breakfast.    METOPROLOL SUCCINATE ORAL Take 25 mg by mouth once daily.     pravastatin sodium (PRAVASTATIN ORAL) Take 40 mg by mouth every evening.     topiramate (TOPAMAX ORAL) Take 50 mg by mouth 2 (two) times daily.     trazodone HCl (TRAZODONE ORAL) Take 25 mg by mouth every evening.      Family History     None        Tobacco Use    Smoking status: Current Every Day Smoker   Substance and Sexual Activity    Alcohol use: Yes     Comment: occ    Drug use: Never    Sexual activity: Yes     Birth control/protection: See Surgical Hx     Review of Systems   Constitutional: Positive for fatigue and fever. Negative for chills.   HENT: Negative for congestion, rhinorrhea, sinus pressure, sinus pain and sore throat.    Eyes: Negative for visual disturbance.   Respiratory: Negative for cough and shortness of breath.    Cardiovascular: Negative for chest pain and palpitations.   Gastrointestinal:        Per HPI   Genitourinary: Negative for difficulty urinating, dysuria, frequency and hematuria.   Musculoskeletal: Negative.    Skin: Negative for rash.   Neurological: Negative for dizziness, light-headedness and headaches.   Hematological: Does not bruise/bleed easily.   Psychiatric/Behavioral: Negative for hallucinations. The patient is not nervous/anxious.      Objective:     Vital Signs (Most Recent):  Temp: 97.5 °F (36.4 °C) (02/10/20 2314)  Pulse: 76 (02/10/20 2314)  Resp: 17 (02/10/20 2314)  BP: (!)  108/57 (02/10/20 2314)  SpO2: 95 % (02/10/20 2314) Vital Signs (24h Range):  Temp:  [96.1 °F (35.6 °C)-98.3 °F (36.8 °C)] 97.5 °F (36.4 °C)  Pulse:  [] 76  Resp:  [16-20] 17  SpO2:  [95 %-98 %] 95 %  BP: (105-140)/(57-98) 108/57     Weight: 79 kg (174 lb 2.6 oz)  Body mass index is 28.98 kg/m².    Physical Exam   Constitutional: She is oriented to person, place, and time. She appears well-developed and well-nourished. No distress.   HENT:   + NGT    Cardiovascular: Normal rate.   Pulmonary/Chest: Effort normal. No respiratory distress.   Abdominal: Soft.   abd soft on nursing palpation  No significant tenderness noted   Musculoskeletal: She exhibits no edema.   Neurological: She is alert and oriented to person, place, and time.   Vitals reviewed.          Significant Labs: All pertinent labs within the past 24 hours have been reviewed.    Significant Imaging: I have reviewed all pertinent imaging results/findings within the past 24 hours.

## 2020-02-11 NOTE — ASSESSMENT & PLAN NOTE
Presenting with abdominal pain, nausea, vomiting, and diarrhea  CT abdomen & pelvis showed: Stomach, bowel, mesentery; normal appearance of the appendix.  Dilated fluid-filled proximal small bowel loops.  Distal small bowel and colon not dilated.  Transition zone is thought to be seen in the mid abdomen with there is also mild circumferential wall thickening. Findings suggesting and consistent with small bowel obstruction.  Transition zone is thought to be visualized in the mid abdomen.  Case was discussed with general surgery in the ER and will consult in AM: consult placed  NGT in place to low intermittent suction  IVF, low dose dilaudid, and zofran PRN   Given leukocytosis, will start cipro and metronidazole for possible GI source; Hb also appears elevated and she could be hemo-concentrated but given the reported fever at home, will err on the side of caution and start above antibiotics   Stool culture   Electrolyte repletion PRN   Abdomen soft and not significantly tender   Otherwise stable

## 2020-02-11 NOTE — PROGRESS NOTES
[facility] Medicine  Progress Note    Patient Name: Helen Freeman  MRN: [id number]  Patient Class: IP- Inpatient   Admission Date: 2/10/2020  Length of Stay: 1 days  Attending Physician: Avery Hedrick MD  Primary Care Provider: Suyapa Zarate MD        Subjective:     Principal Problem:SBO (small bowel obstruction)        HPI:  The patient is a 43 y/o female with PMH of hypothyroidism, HLP, migraine headaches, and insomnia. She presents with a two day history of diffuse abdominal pain, nausea, vomiting, and diarrhea. She has had an associated fever to 101.9 as well. She was in her usual state of health prior to this. She was found to have a SBO on CT abd done in the ER. Patient reports feeling unwell but also reports this is partially due to discomfort from the NGT that was placed as well. She denies any chills, blood in her diarrhea, recent travel, dysuria, or other symptoms.             Overview/Hospital Course:  Patient is a 44-year-old female that presented last evening complaining of 2 day history of nausea vomiting and diarrhea.  Patient had a fever of 101.9.  Patient was admitted for bowel rest antibiotics and symptomatic control.  General surgery was consulted for today.  Labs this morning showed a white blood cell count 9.5 hemoglobin 14.9 hematocrit 44.5 platelets 172 and differential 75 granulocytes 12 lymphocytes.  Sodium 139 potassium 3.9 chloride 111 bicarb 21 calcium 8.0 total protein 5.7 albumin 3.4.  Patient states that she still has abdominal pain with tenderness to palpation in the mid abdomen radiating to her back bilaterally. Plain film x-ray abdomen flat and upright will be done this morning to compare to previous CT done last evening that showed a mid small-bowel obstruction by radiology report.    Interval History:     Review of Systems   Constitutional: Negative for activity change, appetite change, fatigue and fever.   HENT: Negative for  congestion, ear discharge, mouth sores, nosebleeds, rhinorrhea, sinus pressure, sinus pain and tinnitus.    Eyes: Negative.  Negative for pain, redness and itching.   Respiratory: Negative for apnea, cough, choking, chest tightness, shortness of breath, wheezing and stridor.    Cardiovascular: Negative for chest pain, palpitations and leg swelling.   Gastrointestinal: Positive for abdominal distention, abdominal pain, diarrhea, nausea and vomiting. Negative for anal bleeding, blood in stool and constipation.   Endocrine: Negative.    Genitourinary: Negative for difficulty urinating, flank pain, frequency and urgency.   Musculoskeletal: Negative for back pain, gait problem and myalgias.   Skin: Negative for color change and pallor.   Allergic/Immunologic: Negative.    Neurological: Negative for dizziness, facial asymmetry, weakness, light-headedness and headaches.   Hematological: Negative for adenopathy. Does not bruise/bleed easily.   Psychiatric/Behavioral: The patient is nervous/anxious.      Objective:     Vital Signs (Most Recent):  Temp: 98.3 °F (36.8 °C) (02/11/20 1030)  Pulse: 82 (02/11/20 1030)  Resp: 16 (02/11/20 1030)  BP: 113/62 (02/11/20 1030)  SpO2: 98 % (02/11/20 1030) Vital Signs (24h Range):  Temp:  [96.1 °F (35.6 °C)-98.3 °F (36.8 °C)] 98.3 °F (36.8 °C)  Pulse:  [] 82  Resp:  [14-17] 16  SpO2:  [95 %-98 %] 98 %  BP: (105-134)/(57-90) 113/62     Weight: 79 kg (174 lb 2.6 oz)  Body mass index is 28.98 kg/m².    Intake/Output Summary (Last 24 hours) at 2/11/2020 1625  Last data filed at 2/11/2020 1621  Gross per 24 hour   Intake 3257.5 ml   Output 2300 ml   Net 957.5 ml      Physical Exam   Constitutional: She is oriented to person, place, and time. She appears well-developed and well-nourished.   HENT:   Head: Normocephalic and atraumatic.   Eyes: Pupils are equal, round, and reactive to light. EOM are normal.   Neck: Normal range of motion. Neck supple. No tracheal deviation present. No  thyromegaly present.   Cardiovascular: Normal rate, regular rhythm and normal heart sounds.   Pulmonary/Chest: Effort normal and breath sounds normal.   Abdominal: Soft. Bowel sounds are normal. She exhibits distension. There is tenderness. There is guarding. There is no rebound.   Musculoskeletal: Normal range of motion.   Lymphadenopathy:     She has no cervical adenopathy.   Neurological: She is alert and oriented to person, place, and time.   Skin: Skin is warm and dry. Capillary refill takes less than 2 seconds.   Psychiatric: She has a normal mood and affect. Her behavior is normal. Judgment and thought content normal.   Nursing note and vitals reviewed.      Significant Labs:   Recent Lab Results       02/11/20  0558   02/10/20  1722   02/10/20  1719        Albumin 3.4         Alkaline Phosphatase 47         ALT 12         Anion Gap 7         AST 11         Baso # 0.02         Basophil% 0.2         BILIRUBIN TOTAL 0.5  Comment:  For infants and newborns, interpretation of results should be based  on gestational age, weight and in agreement with clinical  observations.  Premature Infant recommended reference ranges:  Up to 24 hours.............<8.0 mg/dL  Up to 48 hours............<12.0 mg/dL  3-5 days..................<15.0 mg/dL  6-29 days.................<15.0 mg/dL           Blood Culture, Routine   No Growth to date[P] No Growth to date[P]     BUN, Bld 12         Calcium 8.0         Chloride 111         CO2 21         Creatinine 0.5         Differential Method Automated         eGFR if  >60.0         eGFR if non  >60.0  Comment:  Calculation used to obtain the estimated glomerular filtration  rate (eGFR) is the CKD-EPI equation.            Eos # 0.3         Eosinophil% 2.6         Glucose 99         Gran # (ANC) 7.2         Gran% 75.1         Hematocrit 44.5         Hemoglobin 14.9         Immature Grans (Abs) 0.03  Comment:  Mild elevation in immature granulocytes is non  specific and   can be seen in a variety of conditions including stress response,   acute inflammation, trauma and pregnancy. Correlation with other   laboratory and clinical findings is essential.           Immature Granulocytes 0.3         Lactate, Vickey     0.7  Comment:  Falsely low lactic acid results can be found in samples   containing >=13.0 mg/dL total bilirubin and/or >=3.5 mg/dL   direct bilirubin.       Lymph # 1.2         Lymph% 12.4         Magnesium 1.7         MCH 31.0         MCHC 33.5         MCV 93         Mono # 0.9         Mono% 9.4         MPV 10.3         nRBC 0         Phosphorus 2.7         Platelets 172         Potassium 3.9         PROTEIN TOTAL 5.7         RBC 4.81         RDW 12.9         Sodium 139         WBC 9.56             All pertinent labs within the past 24 hours have been reviewed.    Significant Imaging: I have reviewed and interpreted all pertinent imaging results/findings within the past 24 hours.      Assessment/Plan:      * SBO (small bowel obstruction)  Presenting with abdominal pain, nausea, vomiting, and diarrhea  CT abdomen & pelvis showed: Stomach, bowel, mesentery; normal appearance of the appendix.  Dilated fluid-filled proximal small bowel loops.  Distal small bowel and colon not dilated.  Transition zone is thought to be seen in the mid abdomen with there is also mild circumferential wall thickening. Findings suggesting and consistent with small bowel obstruction.  Transition zone is thought to be visualized in the mid abdomen.  Case was discussed with general surgery in the ER and will consult in AM: consult placed  NGT in place to low intermittent suction  IVF, low dose dilaudid, and zofran PRN   Given leukocytosis, will start cipro and metronidazole for possible GI source; Hb also appears elevated and she could be hemo-concentrated but given the reported fever at home, will err on the side of caution and start above antibiotics   Stool culture   Electrolyte  repletion PRN   Abdomen soft and not significantly tender   Otherwise stable     02/11/2020:    Continue current treatment with bowel rest and symptomatic control  Repeat labs in the a.m. to include CBC and CMP  Await surgical consult.  Follow-up otherwise as clinically indicated based on course        Tachycardia  Patient reports taking metoprolol succinate for a history of tachycardia  Cannot take PO right now   Will switch to 5 mg IV qam until she is able to tolerate PO  Telemetry         Hyperlipidemia  Resume pravastatin qhs when able to tolerate PO       Insomnia  Resume trazodone QHS when able to tolerate PO       Hypokalemia  Replace IV      Migraine headache  Hold topamax while NPO  Resume when able to tolerate PO         VTE Risk Mitigation (From admission, onward)         Ordered     Place ESTUARDO hose  Until discontinued      02/10/20 2309     Place sequential compression device  Until discontinued      02/10/20 2309                      Avery Hedrick MD  Department of Hospital Medicine   [facility]

## 2020-02-12 VITALS
OXYGEN SATURATION: 98 % | HEART RATE: 64 BPM | DIASTOLIC BLOOD PRESSURE: 69 MMHG | TEMPERATURE: 98 F | HEIGHT: 65 IN | RESPIRATION RATE: 18 BRPM | WEIGHT: 174.19 LBS | SYSTOLIC BLOOD PRESSURE: 121 MMHG | BODY MASS INDEX: 29.02 KG/M2

## 2020-02-12 LAB
ALBUMIN SERPL BCP-MCNC: 3 G/DL (ref 3.5–5.2)
ALP SERPL-CCNC: 39 U/L (ref 55–135)
ALT SERPL W/O P-5'-P-CCNC: 10 U/L (ref 10–44)
ANION GAP SERPL CALC-SCNC: 5 MMOL/L (ref 8–16)
AST SERPL-CCNC: 9 U/L (ref 10–40)
BASOPHILS # BLD AUTO: 0.02 K/UL (ref 0–0.2)
BASOPHILS NFR BLD: 0.4 % (ref 0–1.9)
BILIRUB SERPL-MCNC: 0.5 MG/DL (ref 0.1–1)
BUN SERPL-MCNC: 9 MG/DL (ref 6–20)
CALCIUM SERPL-MCNC: 7.9 MG/DL (ref 8.7–10.5)
CHLORIDE SERPL-SCNC: 113 MMOL/L (ref 95–110)
CO2 SERPL-SCNC: 22 MMOL/L (ref 23–29)
CREAT SERPL-MCNC: 0.6 MG/DL (ref 0.5–1.4)
DIFFERENTIAL METHOD: ABNORMAL
EOSINOPHIL # BLD AUTO: 0.2 K/UL (ref 0–0.5)
EOSINOPHIL NFR BLD: 3.9 % (ref 0–8)
ERYTHROCYTE [DISTWIDTH] IN BLOOD BY AUTOMATED COUNT: 12.7 % (ref 11.5–14.5)
EST. GFR  (AFRICAN AMERICAN): >60 ML/MIN/1.73 M^2
EST. GFR  (NON AFRICAN AMERICAN): >60 ML/MIN/1.73 M^2
GLUCOSE SERPL-MCNC: 74 MG/DL (ref 70–110)
HCT VFR BLD AUTO: 40.1 % (ref 37–48.5)
HGB BLD-MCNC: 13.2 G/DL (ref 12–16)
IMM GRANULOCYTES # BLD AUTO: 0.02 K/UL (ref 0–0.04)
IMM GRANULOCYTES NFR BLD AUTO: 0.4 % (ref 0–0.5)
LYMPHOCYTES # BLD AUTO: 1.5 K/UL (ref 1–4.8)
LYMPHOCYTES NFR BLD: 27.3 % (ref 18–48)
MAGNESIUM SERPL-MCNC: 1.4 MG/DL (ref 1.6–2.6)
MCH RBC QN AUTO: 30.4 PG (ref 27–31)
MCHC RBC AUTO-ENTMCNC: 32.9 G/DL (ref 32–36)
MCV RBC AUTO: 92 FL (ref 82–98)
MONOCYTES # BLD AUTO: 0.7 K/UL (ref 0.3–1)
MONOCYTES NFR BLD: 12.4 % (ref 4–15)
NEUTROPHILS # BLD AUTO: 3.1 K/UL (ref 1.8–7.7)
NEUTROPHILS NFR BLD: 55.6 % (ref 38–73)
NRBC BLD-RTO: 0 /100 WBC
PHOSPHATE SERPL-MCNC: 2.4 MG/DL (ref 2.7–4.5)
PLATELET # BLD AUTO: 146 K/UL (ref 150–350)
PMV BLD AUTO: 10.1 FL (ref 9.2–12.9)
POTASSIUM SERPL-SCNC: 3.4 MMOL/L (ref 3.5–5.1)
PROT SERPL-MCNC: 5 G/DL (ref 6–8.4)
RBC # BLD AUTO: 4.34 M/UL (ref 4–5.4)
SODIUM SERPL-SCNC: 140 MMOL/L (ref 136–145)
WBC # BLD AUTO: 5.57 K/UL (ref 3.9–12.7)

## 2020-02-12 PROCEDURE — 83735 ASSAY OF MAGNESIUM: CPT

## 2020-02-12 PROCEDURE — 80053 COMPREHEN METABOLIC PANEL: CPT

## 2020-02-12 PROCEDURE — 85025 COMPLETE CBC W/AUTO DIFF WBC: CPT

## 2020-02-12 PROCEDURE — 25000003 PHARM REV CODE 250: Performed by: HOSPITALIST

## 2020-02-12 PROCEDURE — 36415 COLL VENOUS BLD VENIPUNCTURE: CPT

## 2020-02-12 PROCEDURE — 99238 HOSP IP/OBS DSCHRG MGMT 30/<: CPT | Mod: ,,, | Performed by: INTERNAL MEDICINE

## 2020-02-12 PROCEDURE — 84100 ASSAY OF PHOSPHORUS: CPT

## 2020-02-12 PROCEDURE — 25000003 PHARM REV CODE 250: Performed by: INTERNAL MEDICINE

## 2020-02-12 PROCEDURE — S0030 INJECTION, METRONIDAZOLE: HCPCS | Performed by: INTERNAL MEDICINE

## 2020-02-12 PROCEDURE — 63600175 PHARM REV CODE 636 W HCPCS: Performed by: INTERNAL MEDICINE

## 2020-02-12 PROCEDURE — 99238 PR HOSPITAL DISCHARGE DAY,<30 MIN: ICD-10-PCS | Mod: ,,, | Performed by: INTERNAL MEDICINE

## 2020-02-12 RX ORDER — ONDANSETRON 4 MG/1
4 TABLET, FILM COATED ORAL EVERY 6 HOURS
Qty: 20 TABLET | Refills: 0 | Status: SHIPPED | OUTPATIENT
Start: 2020-02-12 | End: 2020-07-23

## 2020-02-12 RX ORDER — METRONIDAZOLE 500 MG/1
500 TABLET ORAL 3 TIMES DAILY
Qty: 30 TABLET | Refills: 0 | Status: SHIPPED | OUTPATIENT
Start: 2020-02-12 | End: 2020-02-19

## 2020-02-12 RX ORDER — OXYCODONE AND ACETAMINOPHEN 10; 325 MG/1; MG/1
1 TABLET ORAL EVERY 6 HOURS PRN
Qty: 20 TABLET | Refills: 0 | Status: SHIPPED | OUTPATIENT
Start: 2020-02-12 | End: 2020-07-23

## 2020-02-12 RX ORDER — CIPROFLOXACIN 500 MG/1
500 TABLET ORAL 2 TIMES DAILY
Qty: 20 TABLET | Refills: 0 | Status: SHIPPED | OUTPATIENT
Start: 2020-02-12 | End: 2020-02-22

## 2020-02-12 RX ORDER — MAGNESIUM SULFATE 1 G/100ML
1 INJECTION INTRAVENOUS
Status: COMPLETED | OUTPATIENT
Start: 2020-02-12 | End: 2020-02-12

## 2020-02-12 RX ORDER — POTASSIUM CHLORIDE 20 MEQ/1
40 TABLET, EXTENDED RELEASE ORAL ONCE
Status: COMPLETED | OUTPATIENT
Start: 2020-02-12 | End: 2020-02-12

## 2020-02-12 RX ADMIN — LEVOTHYROXINE SODIUM ANHYDROUS 100 MCG: 100 INJECTION, POWDER, LYOPHILIZED, FOR SOLUTION INTRAVENOUS at 09:02

## 2020-02-12 RX ADMIN — MAGNESIUM SULFATE HEPTAHYDRATE 1 G: 1 INJECTION, SOLUTION INTRAVENOUS at 12:02

## 2020-02-12 RX ADMIN — METRONIDAZOLE 500 MG: 500 INJECTION, SOLUTION INTRAVENOUS at 02:02

## 2020-02-12 RX ADMIN — POTASSIUM CHLORIDE 40 MEQ: 1500 TABLET, EXTENDED RELEASE ORAL at 11:02

## 2020-02-12 RX ADMIN — MAGNESIUM SULFATE HEPTAHYDRATE 1 G: 1 INJECTION, SOLUTION INTRAVENOUS at 11:02

## 2020-02-12 RX ADMIN — METRONIDAZOLE 500 MG: 500 INJECTION, SOLUTION INTRAVENOUS at 09:02

## 2020-02-12 NOTE — NURSING
IV REMOVED AT THIS TIME. CATH INTACT. PT TOLERATED WELL. TELE RETURNED TO TELE Seva Coffee. REVIEWED FOLLOW UP APPOINTMENTS, NEW MEDICATIONS, AND DISCHARGE INSTRUCTIONS WITH PATIENT AT THIS TIME. PT AND SPOUSE VERBALIZED UNDERSTANDING. NO COMPLAINTS OF PAIN. NO DISTRESS NOTED. PATIENT AMBULATED TO FRONT OF HOSPITAL WITH NURSE AT SIDE.

## 2020-02-12 NOTE — PLAN OF CARE
02/12/20 1342   Final Note   Assessment Type Final Discharge Note   Anticipated Discharge Disposition Home   What phone number can be called within the next 1-3 days to see how you are doing after discharge? [phone]   Hospital Follow Up  Appt(s) scheduled? Yes   Discharge plans and expectations educations in teach back method with documentation complete? Yes   SURGEON DETERMINED THAT PT DID NOT H AVE A BOWEL OBSTRUCTION AND PULLED HER NG TUBE AND CATHETER LAST NIGHT. PT IS DISCHARGED HOME TODAY.  NO DISCHARGE NEEDS IDENTIFIED AT THIS TIME.

## 2020-02-12 NOTE — ASSESSMENT & PLAN NOTE
Presenting with abdominal pain, nausea, vomiting, and diarrhea  CT abdomen & pelvis showed: Stomach, bowel, mesentery; normal appearance of the appendix.  Dilated fluid-filled proximal small bowel loops.  Distal small bowel and colon not dilated.  Transition zone is thought to be seen in the mid abdomen with there is also mild circumferential wall thickening. Findings suggesting and consistent with small bowel obstruction.  Transition zone is thought to be visualized in the mid abdomen.  Case was discussed with general surgery in the ER and will consult in AM: consult placed  NGT in place to low intermittent suction  IVF, low dose dilaudid, and zofran PRN   Given leukocytosis, will start cipro and metronidazole for possible GI source; Hb also appears elevated and she could be hemo-concentrated but given the reported fever at home, will err on the side of caution and start above antibiotics   Stool culture   Electrolyte repletion PRN   Abdomen soft and not significantly tender   Otherwise stable     02/11/2020:    Continue current treatment with bowel rest and symptomatic control  Repeat labs in the a.m. to include CBC and CMP  Await surgical consult.  Follow-up otherwise as clinically indicated based on course    02/12/2020:  Patient small bowel obstruction has resolved patient is tolerating clear liquids.  Will discharge this patient to home for follow-up with her primary care physician  Will begin ciprofloxacin 500 mg p.o. b.i.d. and metronidazole 500 mg t.i.d. for 10 days  Will give Zofran 4 mg p.o. b.i.d. as needed for nausea.  Patient will also be given Norco 10/3251 p.o. Q 4 6 hr p.r.n. pain 20.  No refill

## 2020-02-12 NOTE — PLAN OF CARE
Problem: Adult Inpatient Plan of Care  Goal: Plan of Care Review  Outcome: Ongoing, Progressing     Problem: Infection  Goal: Infection Symptom Resolution  Outcome: Ongoing, Progressing     NG tube and vale catheter removed during shift.  Patient has voided freely without difficulty post vale catheter removal. Diet advanced to clear liquids for the am.  NAD noted.

## 2020-02-12 NOTE — ASSESSMENT & PLAN NOTE
02/12/2020:  Continue ciprofloxacin 500 mg p.o. b.i.d.  Continue metronidazole 500 mg p.o. t.i.d.  Treat symptomatically for nausea and vomiting and pain.  Follow up with primary care physician 1 week.

## 2020-02-12 NOTE — DISCHARGE SUMMARY
[facility] Hospital Medicine  Discharge Summary      Patient Name: Helen Freeman  MRN: [id number]  Admission Date: 2/10/2020  Hospital Length of Stay: 2 days  Discharge Date and Time:  02/12/2020 10:11 AM  Attending Physician: Avery Hedrick MD   Discharging Provider: Avery Hedrick MD  Primary Care Provider: Suyapa Zarate MD      HPI:   The patient is a 45 y/o female with PMH of hypothyroidism, HLP, migraine headaches, and insomnia. She presents with a two day history of diffuse abdominal pain, nausea, vomiting, and diarrhea. She has had an associated fever to 101.9 as well. She was in her usual state of health prior to this. She was found to have a SBO on CT abd done in the ER. Patient reports feeling unwell but also reports this is partially due to discomfort from the NGT that was placed as well. She denies any chills, blood in her diarrhea, recent travel, dysuria, or other symptoms.             * No surgery found *      Hospital Course:   Patient is a 44-year-old female that presented last evening complaining of 2 day history of nausea vomiting and diarrhea.  Patient had a fever of 101.9.  Patient was admitted for bowel rest antibiotics and symptomatic control.  General surgery was consulted for today.  Labs this morning showed a white blood cell count 9.5 hemoglobin 14.9 hematocrit 44.5 platelets 172 and differential 75 granulocytes 12 lymphocytes.  Sodium 139 potassium 3.9 chloride 111 bicarb 21 calcium 8.0 total protein 5.7 albumin 3.4.  Patient states that she still has abdominal pain with tenderness to palpation in the mid abdomen radiating to her back bilaterally. Plain film x-ray abdomen flat and upright will be done this morning to compare to previous CT done last evening that showed a mid small-bowel obstruction by radiology report.    02/12/2020:  Patient was seen by Dr. Olea, general surgery.  Patient did not seem to have obstruction by plain film the morning  after admission.  Dr. Olea is pending was this patient had a gastroenteritis.  Patient has improved with antibiotic therapy and conservative treatment.  Patient states she feels better and has tolerated clear liquid diet.  Labs this morning revealed phosphorus 2.4 magnesium 1.4 is potassium 3.4 bicarb 22 glucose 74 and BUN creatinine were 9 and 0.6.  White blood cell count was completely normal with platelets 146 and normal differential.  Blood cultures are negative so far.  Patient will have magnesium and potassium repleted prior to discharge. However feel as patient could be discharged home and will continue ciprofloxacin 500 mg p.o. b.i.d. for 10 days and metronidazole 500 mg t.i.d. for 10 days.  Patient should follow with her primary care physician within 1 week and return if worsening.     Consults:   Consults (From admission, onward)        Status Ordering Provider     Inpatient consult to General Surgery  Once     Provider:  Carlitos Olea MD    Completed IVETTE BILLY          * Gastroenteritis, infectious, presumed  02/12/2020:  Continue ciprofloxacin 500 mg p.o. b.i.d.  Continue metronidazole 500 mg p.o. t.i.d.  Treat symptomatically for nausea and vomiting and pain.  Follow up with primary care physician 1 week.      SBO (small bowel obstruction)  Presenting with abdominal pain, nausea, vomiting, and diarrhea  CT abdomen & pelvis showed: Stomach, bowel, mesentery; normal appearance of the appendix.  Dilated fluid-filled proximal small bowel loops.  Distal small bowel and colon not dilated.  Transition zone is thought to be seen in the mid abdomen with there is also mild circumferential wall thickening. Findings suggesting and consistent with small bowel obstruction.  Transition zone is thought to be visualized in the mid abdomen.  Case was discussed with general surgery in the ER and will consult in AM: consult placed  NGT in place to low intermittent suction  IVF, low dose dilaudid, and zofran  PRN   Given leukocytosis, will start cipro and metronidazole for possible GI source; Hb also appears elevated and she could be hemo-concentrated but given the reported fever at home, will err on the side of caution and start above antibiotics   Stool culture   Electrolyte repletion PRN   Abdomen soft and not significantly tender   Otherwise stable     02/11/2020:    Continue current treatment with bowel rest and symptomatic control  Repeat labs in the a.m. to include CBC and CMP  Await surgical consult.  Follow-up otherwise as clinically indicated based on course    02/12/2020:  Patient small bowel obstruction has resolved patient is tolerating clear liquids.  Will discharge this patient to home for follow-up with her primary care physician  Will begin ciprofloxacin 500 mg p.o. b.i.d. and metronidazole 500 mg t.i.d. for 10 days  Will give Zofran 4 mg p.o. b.i.d. as needed for nausea.  Patient will also be given Norco 10/3251 p.o. Q 4 6 hr p.r.n. pain 20.  No refill        Final Active Diagnoses:    Diagnosis Date Noted POA    PRINCIPAL PROBLEM:  Gastroenteritis, infectious, presumed [K52.9]  Yes    Ileus [K56.7]  Yes    SBO (small bowel obstruction) [K56.609] 02/10/2020 Yes    Migraine headache [G43.909] 02/10/2020 Yes    Hypokalemia [E87.6] 02/10/2020 Yes    Insomnia [G47.00] 02/10/2020 Yes    Hyperlipidemia [E78.5] 02/10/2020 Yes    Tachycardia [R00.0] 02/10/2020 Yes      Problems Resolved During this Admission:       Discharged Condition: stable    Disposition:     Follow Up:    Patient Instructions:   No discharge procedures on file.    Significant Diagnostic Studies: Labs: All labs within the past 24 hours have been reviewed    Pending Diagnostic Studies:     None         Medications:  Reconciled Home Medications:      Medication List      ASK your doctor about these medications    METOPROLOL SUCCINATE ORAL  Take 25 mg by mouth once daily.     PRAVASTATIN ORAL  Take 40 mg by mouth every evening.      Synthroid 137 MCG Tab tablet  Generic drug:  levothyroxine  Take by mouth before breakfast.     TOPAMAX ORAL  Take 50 mg by mouth 2 (two) times daily.     TRAZODONE ORAL  Take 25 mg by mouth every evening.            Indwelling Lines/Drains at time of discharge:   Lines/Drains/Airways     None                 Time spent on the discharge of patient: 25 minutes  Patient was seen and examined on the date of discharge and determined to be suitable for discharge.         Avery Hedrick MD  Department of Hospital Medicine  [facility]

## 2020-02-16 LAB
BACTERIA BLD CULT: NORMAL
BACTERIA BLD CULT: NORMAL

## 2020-02-17 NOTE — PHYSICIAN QUERY
PT Name: Helen Freeman  MR #: [id number]     Physician Query Form - Documentation Clarification      CDS: Brandy Capley, RN  Email: [email or web address]  Spectralink:  [phone] (Tues-Thurs)    This form is a permanent document in the medical record.     Query Date: February 17, 2020    By submitting this query, we are merely seeking further clarification of documentation. Please utilize your independent clinical judgment when addressing the question(s) below.    The Medical record reflects the following:    Supporting Clinical Findings Location in Medical Record     Ileus      Discharge summary 2/12   Hospital Course:   Patient is a 44-year-old female that presented last evening complaining of 2 day history of nausea vomiting and diarrhea.  Patient had a fever of 101.9.  Patient was admitted for bowel rest antibiotics and symptomatic control.      02/12/2020:    Patient was seen by Dr. Olea, general surgery.  Patient did not seem to have obstruction by plain film the morning after admission.  Dr. Olea is pending was this patient had a gastroenteritis.  Patient has improved with antibiotic therapy and conservative treatment.     NGT in place to low intermittent suction  IVF, low dose dilaudid, and zofran PRN    Discharge summary 2/12                                                                            Doctor, Please specify diagnosis or diagnoses associated with above clinical findings.  Please further specify ileus.     Provider Use Only     ( x )  Paralytic     (  )  Other (please specify):  _____________                                                                                                               [  ] Clinically Undetermined

## 2020-07-23 PROBLEM — Z01.419 WELL WOMAN EXAM WITH ROUTINE GYNECOLOGICAL EXAM: Status: ACTIVE | Noted: 2020-07-23

## 2020-08-03 ENCOUNTER — HOSPITAL ENCOUNTER (OUTPATIENT)
Dept: PREADMISSION TESTING | Facility: HOSPITAL | Age: 45
Discharge: HOME | End: 2020-08-03
Attending: OBSTETRICS & GYNECOLOGY
Payer: COMMERCIAL

## 2020-08-03 VITALS — WEIGHT: 151 LBS | BODY MASS INDEX: 25.16 KG/M2 | HEIGHT: 65 IN

## 2020-08-03 DIAGNOSIS — Z01.818 PRE-OP EXAM: ICD-10-CM

## 2020-08-03 PROCEDURE — 99900103 DSU ONLY-NO CHARGE-INITIAL HR (STAT)

## 2020-08-03 PROCEDURE — U0003 INFECTIOUS AGENT DETECTION BY NUCLEIC ACID (DNA OR RNA); SEVERE ACUTE RESPIRATORY SYNDROME CORONAVIRUS 2 (SARS-COV-2) (CORONAVIRUS DISEASE [COVID-19]), AMPLIFIED PROBE TECHNIQUE, MAKING USE OF HIGH THROUGHPUT TECHNOLOGIES AS DESCRIBED BY CMS-2020-01-R: HCPCS

## 2020-08-03 NOTE — H&P (VIEW-ONLY)
Subjective:       Patient ID: Helen Freeman is a 44 y.o. female.    Chief Complaint:  Pre-op Exam      History of Present Illness  Patient is status post an ablation approximately 6 years ago.  She reports that she has not had a menses in 6 years into February.  The patient was hospitalized for a rule out small bowel obstruction which was noted to be [place] virus.  Ever since then she started having menses every month.  Each month they have increased in the days of bleeding as well as the pain of the cramping.  She reports that NSAIDs do not help with the cramping.  She desires definitive treatment for the bleeding and cramping.  Consent signed and on chart.  Risks and benefits explained.  All questions were answered.      Past Medical History:   Diagnosis Date    Hypertension     Migraine headache     Tachycardia     Thyroid disease      Past Surgical History:   Procedure Laterality Date     SECTION      3 times    ENDOMETRIAL ABLATION       Social History     Tobacco Use    Smoking status: Current Every Day Smoker     Packs/day: 1.50     Years: 35.00     Pack years: 52.50     Types: Cigarettes    Smokeless tobacco: Never Used   Substance Use Topics    Alcohol use: Yes     Comment: occ    Drug use: Never          Current Outpatient Medications:     biotin 10,000 mcg TbDL, Take by mouth., Disp: , Rfl:     ergocalciferol, vitamin D2, (VITAMIN D ORAL), Take 5,000 Units by mouth., Disp: , Rfl:     levothyroxine (SYNTHROID) 137 MCG Tab tablet, Take by mouth before breakfast., Disp: , Rfl:     METOPROLOL SUCCINATE ORAL, Take 25 mg by mouth once daily. , Disp: , Rfl:     pravastatin sodium (PRAVASTATIN ORAL), Take 40 mg by mouth every evening. , Disp: , Rfl:     rizatriptan (MAXALT) 10 MG tablet, as needed., Disp: , Rfl:     rosuvastatin (CRESTOR) 10 MG tablet, , Disp: , Rfl:     sodium bicarbonate 650 MG tablet, Take 650 mg by mouth 4 (four) times daily., Disp: , Rfl:     topiramate (TOPAMAX  ORAL), Take 50 mg by mouth 2 (two) times daily. , Disp: , Rfl:     traZODone (DESYREL) 50 MG tablet, , Disp: , Rfl:    Review of patient's allergies indicates:   Allergen Reactions    Codeine         GYN & OB History  No LMP recorded. Patient has had an ablation.   Date of Last Pap: 2020    OB History    Para Term  AB Living   3 3 3     3   SAB TAB Ectopic Multiple Live Births           3      # Outcome Date GA Lbr Endy/2nd Weight Sex Delivery Anes PTL Lv   3 Term 03    F CS-Unspec   ARMAAN   2 Term 01    F CS-Unspec   ARMAAN   1 Term 97    F CS-Unspec   ARMAAN       Review of Systems  Review of Systems   Constitutional: Negative for activity change, fatigue and unexpected weight change.   HENT: Negative for nasal congestion and tinnitus.    Eyes: Negative for discharge and visual disturbance.   Respiratory: Negative for cough, shortness of breath and wheezing.    Cardiovascular: Negative for chest pain, palpitations and leg swelling.   Gastrointestinal: Negative for abdominal pain, bloating, blood in stool, constipation, diarrhea, nausea, vomiting and reflux.   Endocrine: Negative for diabetes, hair loss, hot flashes, hyperthyroidism and hypothyroidism.   Genitourinary: Positive for dysmenorrhea, menorrhagia, menstrual problem, pelvic pain and postcoital bleeding. Negative for bladder incontinence, decreased libido, dyspareunia, dysuria, frequency, genital sores, hematuria, hot flashes, urgency, vaginal discharge, vaginal pain, urinary incontinence and vaginal odor.   Musculoskeletal: Negative for arthralgias, joint swelling and myalgias.   Integumentary:  Negative for rash, acne, hair changes, mole/lesion, breast mass, nipple discharge, breast skin changes and breast tenderness.   Neurological: Negative for vertigo, seizures, syncope, numbness and headaches.   Hematological: Negative for adenopathy. Does not bruise/bleed easily.   Psychiatric/Behavioral: Negative for depression and  sleep disturbance. The patient is not nervous/anxious.    Breast: Negative for asymmetry, breast self exam, lump, mass, mastodynia, nipple discharge, skin changes and tenderness          Objective:    Physical Exam:   Constitutional: She is oriented to person, place, and time. She appears well-developed and well-nourished.    HENT:   Head: Normocephalic and atraumatic.   Nose: No epistaxis.    Eyes: EOM are normal.    Neck: Normal range of motion and full passive range of motion without pain. Neck supple.    Cardiovascular: Normal rate, regular rhythm and normal heart sounds.     Pulmonary/Chest: Effort normal and breath sounds normal.        Abdominal: Soft. Bowel sounds are normal.     Genitourinary:    Vagina and uterus normal.   There is no lesion on the right labia. There is no lesion on the left labia. Cervix is normal. Right adnexum displays no mass, no tenderness and no fullness. Left adnexum displays no mass, no tenderness and no fullness. Vaginal cuff normal.Cervix exhibits no motion tenderness.           Musculoskeletal: Normal range of motion.       Neurological: She is alert and oriented to person, place, and time. She has normal strength.    Skin: Skin is warm and dry.    Psychiatric: She has a normal mood and affect. Her speech is normal and behavior is normal.          Assessment:        1. Preop testing    2. S/P endometrial ablation    3. Severe dysmenorrhea               Plan:      Preop testing  -     Pregnancy, urine rapid  -     EKG 12-lead; Future  -     Cancel: CBC W/ AUTO DIFFERENTIAL; Future; Expected date: 08/03/2020  -     Cancel: POCT URINE PREGNANCY  -     POCT urine pregnancy  -     CBC auto differential; Future; Expected date: 08/03/2020  -     X-Ray Chest 1 View Pre-OP; Future; Expected date: 08/03/2020  -     EKG 12-lead; Future  -     Cancel: COVID-19 Routine Screening; Future; Expected date: 08/03/2020    S/P endometrial ablation    Severe dysmenorrhea     consent signed and on  chart.  Risks and benefits explained.  Patient understands the possible need for open hysterectomy secondary to history of a previous  x3.  She desires definitive treatment with hysterectomy secondary to failed endometrial ablation.     Follow up in 2 days (on 2020) for TLH.

## 2020-08-04 LAB — SARS-COV-2 RNA RESP QL NAA+PROBE: NOT DETECTED

## 2020-08-05 ENCOUNTER — ANESTHESIA EVENT (OUTPATIENT)
Dept: SURGERY | Facility: HOSPITAL | Age: 45
End: 2020-08-05
Payer: COMMERCIAL

## 2020-08-05 ENCOUNTER — HOSPITAL ENCOUNTER (OUTPATIENT)
Facility: HOSPITAL | Age: 45
Discharge: HOME | End: 2020-08-06
Attending: OBSTETRICS & GYNECOLOGY | Admitting: OBSTETRICS & GYNECOLOGY
Payer: COMMERCIAL

## 2020-08-05 ENCOUNTER — ANESTHESIA (OUTPATIENT)
Dept: SURGERY | Facility: HOSPITAL | Age: 45
End: 2020-08-05
Payer: COMMERCIAL

## 2020-08-05 DIAGNOSIS — D25.1 INTRAMURAL LEIOMYOMA OF UTERUS: ICD-10-CM

## 2020-08-05 DIAGNOSIS — N94.6 DYSMENORRHEA: ICD-10-CM

## 2020-08-05 DIAGNOSIS — N94.6 SEVERE DYSMENORRHEA: ICD-10-CM

## 2020-08-05 DIAGNOSIS — Z98.890 S/P ENDOMETRIAL ABLATION: ICD-10-CM

## 2020-08-05 DIAGNOSIS — Z01.419 WELL WOMAN EXAM WITH ROUTINE GYNECOLOGICAL EXAM: ICD-10-CM

## 2020-08-05 DIAGNOSIS — N92.1 MENORRHAGIA WITH IRREGULAR CYCLE: Primary | ICD-10-CM

## 2020-08-05 PROCEDURE — 71000039 HC RECOVERY, EACH ADD'L HOUR: Performed by: OBSTETRICS & GYNECOLOGY

## 2020-08-05 PROCEDURE — 63600175 PHARM REV CODE 636 W HCPCS: Performed by: OBSTETRICS & GYNECOLOGY

## 2020-08-05 PROCEDURE — D9220A PRA ANESTHESIA: ICD-10-PCS | Mod: CRNA,,, | Performed by: NURSE ANESTHETIST, CERTIFIED REGISTERED

## 2020-08-05 PROCEDURE — 37000009 HC ANESTHESIA EA ADD 15 MINS: Performed by: OBSTETRICS & GYNECOLOGY

## 2020-08-05 PROCEDURE — 63600175 PHARM REV CODE 636 W HCPCS: Performed by: ANESTHESIOLOGY

## 2020-08-05 PROCEDURE — 36000711: Performed by: OBSTETRICS & GYNECOLOGY

## 2020-08-05 PROCEDURE — 88307 TISSUE EXAM BY PATHOLOGIST: CPT | Mod: 26,,, | Performed by: PATHOLOGY

## 2020-08-05 PROCEDURE — D9220A PRA ANESTHESIA: Mod: ANES,,, | Performed by: ANESTHESIOLOGY

## 2020-08-05 PROCEDURE — 25000003 PHARM REV CODE 250: Performed by: OBSTETRICS & GYNECOLOGY

## 2020-08-05 PROCEDURE — 94799 UNLISTED PULMONARY SVC/PX: CPT

## 2020-08-05 PROCEDURE — 36000710: Performed by: OBSTETRICS & GYNECOLOGY

## 2020-08-05 PROCEDURE — 27201423 OPTIME MED/SURG SUP & DEVICES STERILE SUPPLY: Performed by: OBSTETRICS & GYNECOLOGY

## 2020-08-05 PROCEDURE — 88307 TISSUE EXAM BY PATHOLOGIST: CPT | Performed by: PATHOLOGY

## 2020-08-05 PROCEDURE — 71000033 HC RECOVERY, INTIAL HOUR: Performed by: OBSTETRICS & GYNECOLOGY

## 2020-08-05 PROCEDURE — 63600175 PHARM REV CODE 636 W HCPCS: Performed by: NURSE ANESTHETIST, CERTIFIED REGISTERED

## 2020-08-05 PROCEDURE — D9220A PRA ANESTHESIA: Mod: CRNA,,, | Performed by: NURSE ANESTHETIST, CERTIFIED REGISTERED

## 2020-08-05 PROCEDURE — 25000003 PHARM REV CODE 250: Performed by: NURSE ANESTHETIST, CERTIFIED REGISTERED

## 2020-08-05 PROCEDURE — 37000008 HC ANESTHESIA 1ST 15 MINUTES: Performed by: OBSTETRICS & GYNECOLOGY

## 2020-08-05 PROCEDURE — D9220A PRA ANESTHESIA: ICD-10-PCS | Mod: ANES,,, | Performed by: ANESTHESIOLOGY

## 2020-08-05 PROCEDURE — 88307 PR  SURG PATH,LEVEL V: ICD-10-PCS | Mod: 26,,, | Performed by: PATHOLOGY

## 2020-08-05 RX ORDER — IBUPROFEN 400 MG/1
800 TABLET ORAL EVERY 8 HOURS
Status: DISCONTINUED | OUTPATIENT
Start: 2020-08-06 | End: 2020-08-06 | Stop reason: HOSPADM

## 2020-08-05 RX ORDER — ENOXAPARIN SODIUM 100 MG/ML
40 INJECTION SUBCUTANEOUS EVERY 24 HOURS
Status: DISCONTINUED | OUTPATIENT
Start: 2020-08-06 | End: 2020-08-06 | Stop reason: HOSPADM

## 2020-08-05 RX ORDER — URINARY ANTISEPTIC ANTISPASMODIC 81.6; 40.8; 10.8; .12 MG/1; MG/1; MG/1; MG/1
1 TABLET ORAL
Status: COMPLETED | OUTPATIENT
Start: 2020-08-05 | End: 2020-08-05

## 2020-08-05 RX ORDER — GLYCOPYRROLATE 0.2 MG/ML
INJECTION INTRAMUSCULAR; INTRAVENOUS
Status: DISCONTINUED | OUTPATIENT
Start: 2020-08-05 | End: 2020-08-05

## 2020-08-05 RX ORDER — MEPERIDINE HYDROCHLORIDE 50 MG/ML
INJECTION INTRAMUSCULAR; INTRAVENOUS; SUBCUTANEOUS
Status: DISCONTINUED | OUTPATIENT
Start: 2020-08-05 | End: 2020-08-05

## 2020-08-05 RX ORDER — ONDANSETRON 4 MG/1
8 TABLET, ORALLY DISINTEGRATING ORAL EVERY 8 HOURS PRN
Status: DISCONTINUED | OUTPATIENT
Start: 2020-08-05 | End: 2020-08-06 | Stop reason: HOSPADM

## 2020-08-05 RX ORDER — MORPHINE SULFATE 10 MG/ML
8 INJECTION INTRAMUSCULAR; INTRAVENOUS; SUBCUTANEOUS EVERY 4 HOURS PRN
Status: DISCONTINUED | OUTPATIENT
Start: 2020-08-05 | End: 2020-08-06 | Stop reason: HOSPADM

## 2020-08-05 RX ORDER — PHENAZOPYRIDINE HYDROCHLORIDE 100 MG/1
200 TABLET, FILM COATED ORAL
Status: DISCONTINUED | OUTPATIENT
Start: 2020-08-05 | End: 2020-08-05

## 2020-08-05 RX ORDER — KETOROLAC TROMETHAMINE 30 MG/ML
INJECTION, SOLUTION INTRAMUSCULAR; INTRAVENOUS
Status: DISCONTINUED | OUTPATIENT
Start: 2020-08-05 | End: 2020-08-05

## 2020-08-05 RX ORDER — SIMETHICONE 80 MG
80 TABLET,CHEWABLE ORAL EVERY 4 HOURS PRN
Status: DISCONTINUED | OUTPATIENT
Start: 2020-08-05 | End: 2020-08-06 | Stop reason: HOSPADM

## 2020-08-05 RX ORDER — ACETAMINOPHEN 325 MG/1
650 TABLET ORAL EVERY 4 HOURS PRN
Status: DISCONTINUED | OUTPATIENT
Start: 2020-08-05 | End: 2020-08-06 | Stop reason: HOSPADM

## 2020-08-05 RX ORDER — CEFAZOLIN SODIUM 2 G/50ML
2 SOLUTION INTRAVENOUS
Status: COMPLETED | OUTPATIENT
Start: 2020-08-05 | End: 2020-08-06

## 2020-08-05 RX ORDER — ONDANSETRON 2 MG/ML
INJECTION INTRAMUSCULAR; INTRAVENOUS
Status: DISCONTINUED | OUTPATIENT
Start: 2020-08-05 | End: 2020-08-05

## 2020-08-05 RX ORDER — OXYCODONE AND ACETAMINOPHEN 5; 325 MG/1; MG/1
1 TABLET ORAL
Status: DISCONTINUED | OUTPATIENT
Start: 2020-08-05 | End: 2020-08-05 | Stop reason: HOSPADM

## 2020-08-05 RX ORDER — DIPHENHYDRAMINE HCL 25 MG
25 CAPSULE ORAL EVERY 4 HOURS PRN
Status: DISCONTINUED | OUTPATIENT
Start: 2020-08-05 | End: 2020-08-06 | Stop reason: HOSPADM

## 2020-08-05 RX ORDER — PROPOFOL 10 MG/ML
VIAL (ML) INTRAVENOUS
Status: DISCONTINUED | OUTPATIENT
Start: 2020-08-05 | End: 2020-08-05

## 2020-08-05 RX ORDER — CEFAZOLIN SODIUM 2 G/50ML
2 SOLUTION INTRAVENOUS
Status: DISCONTINUED | OUTPATIENT
Start: 2020-08-05 | End: 2020-08-05

## 2020-08-05 RX ORDER — MORPHINE SULFATE 10 MG/ML
2 INJECTION INTRAMUSCULAR; INTRAVENOUS; SUBCUTANEOUS EVERY 5 MIN PRN
Status: CANCELLED | OUTPATIENT
Start: 2020-08-05

## 2020-08-05 RX ORDER — HYDROMORPHONE HYDROCHLORIDE 2 MG/ML
1 INJECTION, SOLUTION INTRAMUSCULAR; INTRAVENOUS; SUBCUTANEOUS EVERY 4 HOURS PRN
Status: DISCONTINUED | OUTPATIENT
Start: 2020-08-05 | End: 2020-08-06 | Stop reason: HOSPADM

## 2020-08-05 RX ORDER — SODIUM CHLORIDE, SODIUM LACTATE, POTASSIUM CHLORIDE, CALCIUM CHLORIDE 600; 310; 30; 20 MG/100ML; MG/100ML; MG/100ML; MG/100ML
INJECTION, SOLUTION INTRAVENOUS CONTINUOUS
Status: DISCONTINUED | OUTPATIENT
Start: 2020-08-05 | End: 2020-08-05

## 2020-08-05 RX ORDER — KETOROLAC TROMETHAMINE 30 MG/ML
30 INJECTION, SOLUTION INTRAMUSCULAR; INTRAVENOUS EVERY 6 HOURS
Status: COMPLETED | OUTPATIENT
Start: 2020-08-05 | End: 2020-08-06

## 2020-08-05 RX ORDER — CEFAZOLIN SODIUM 2 G/50ML
2 SOLUTION INTRAVENOUS
Status: COMPLETED | OUTPATIENT
Start: 2020-08-05 | End: 2020-08-05

## 2020-08-05 RX ORDER — MORPHINE SULFATE 10 MG/ML
4 INJECTION INTRAMUSCULAR; INTRAVENOUS; SUBCUTANEOUS EVERY 4 HOURS PRN
Status: DISCONTINUED | OUTPATIENT
Start: 2020-08-05 | End: 2020-08-06 | Stop reason: HOSPADM

## 2020-08-05 RX ORDER — ROCURONIUM BROMIDE 10 MG/ML
INJECTION, SOLUTION INTRAVENOUS
Status: DISCONTINUED | OUTPATIENT
Start: 2020-08-05 | End: 2020-08-05

## 2020-08-05 RX ORDER — BUPIVACAINE HYDROCHLORIDE AND EPINEPHRINE 5; 5 MG/ML; UG/ML
INJECTION, SOLUTION EPIDURAL; INTRACAUDAL; PERINEURAL
Status: DISCONTINUED | OUTPATIENT
Start: 2020-08-05 | End: 2020-08-05 | Stop reason: HOSPADM

## 2020-08-05 RX ORDER — MEPERIDINE HYDROCHLORIDE 50 MG/ML
INJECTION INTRAMUSCULAR; INTRAVENOUS; SUBCUTANEOUS
Status: DISPENSED
Start: 2020-08-05 | End: 2020-08-05

## 2020-08-05 RX ORDER — ACETAMINOPHEN 10 MG/ML
1000 INJECTION, SOLUTION INTRAVENOUS EVERY 8 HOURS
Status: COMPLETED | OUTPATIENT
Start: 2020-08-05 | End: 2020-08-06

## 2020-08-05 RX ORDER — SODIUM CHLORIDE, SODIUM LACTATE, POTASSIUM CHLORIDE, CALCIUM CHLORIDE 600; 310; 30; 20 MG/100ML; MG/100ML; MG/100ML; MG/100ML
125 INJECTION, SOLUTION INTRAVENOUS CONTINUOUS
Status: DISCONTINUED | OUTPATIENT
Start: 2020-08-05 | End: 2020-08-05

## 2020-08-05 RX ORDER — SUCCINYLCHOLINE CHLORIDE 20 MG/ML
INJECTION INTRAMUSCULAR; INTRAVENOUS
Status: DISCONTINUED | OUTPATIENT
Start: 2020-08-05 | End: 2020-08-05

## 2020-08-05 RX ORDER — KETOROLAC TROMETHAMINE 30 MG/ML
15 INJECTION, SOLUTION INTRAMUSCULAR; INTRAVENOUS ONCE
Status: DISCONTINUED | OUTPATIENT
Start: 2020-08-05 | End: 2020-08-05 | Stop reason: HOSPADM

## 2020-08-05 RX ORDER — SODIUM CHLORIDE, SODIUM LACTATE, POTASSIUM CHLORIDE, CALCIUM CHLORIDE 600; 310; 30; 20 MG/100ML; MG/100ML; MG/100ML; MG/100ML
INJECTION, SOLUTION INTRAVENOUS CONTINUOUS
Status: DISCONTINUED | OUTPATIENT
Start: 2020-08-05 | End: 2020-08-06 | Stop reason: HOSPADM

## 2020-08-05 RX ORDER — MIDAZOLAM HYDROCHLORIDE 1 MG/ML
INJECTION, SOLUTION INTRAMUSCULAR; INTRAVENOUS
Status: DISCONTINUED | OUTPATIENT
Start: 2020-08-05 | End: 2020-08-05

## 2020-08-05 RX ORDER — OXYCODONE AND ACETAMINOPHEN 10; 325 MG/1; MG/1
1 TABLET ORAL EVERY 6 HOURS PRN
Status: DISCONTINUED | OUTPATIENT
Start: 2020-08-05 | End: 2020-08-06 | Stop reason: HOSPADM

## 2020-08-05 RX ORDER — CEFAZOLIN SODIUM 2 G/50ML
SOLUTION INTRAVENOUS
Status: COMPLETED
Start: 2020-08-05 | End: 2020-08-05

## 2020-08-05 RX ORDER — ONDANSETRON 2 MG/ML
4 INJECTION INTRAMUSCULAR; INTRAVENOUS DAILY PRN
Status: DISCONTINUED | OUTPATIENT
Start: 2020-08-05 | End: 2020-08-05 | Stop reason: HOSPADM

## 2020-08-05 RX ORDER — PHENAZOPYRIDINE HYDROCHLORIDE 100 MG/1
100 TABLET, FILM COATED ORAL
Status: DISCONTINUED | OUTPATIENT
Start: 2020-08-05 | End: 2020-08-05

## 2020-08-05 RX ORDER — MEPERIDINE HYDROCHLORIDE 50 MG/ML
12.5 INJECTION INTRAMUSCULAR; INTRAVENOUS; SUBCUTANEOUS
Status: ACTIVE | OUTPATIENT
Start: 2020-08-05 | End: 2020-08-05

## 2020-08-05 RX ORDER — OXYCODONE AND ACETAMINOPHEN 7.5; 325 MG/1; MG/1
1 TABLET ORAL EVERY 4 HOURS PRN
Status: DISCONTINUED | OUTPATIENT
Start: 2020-08-05 | End: 2020-08-06 | Stop reason: HOSPADM

## 2020-08-05 RX ORDER — DEXAMETHASONE SODIUM PHOSPHATE 4 MG/ML
INJECTION, SOLUTION INTRA-ARTICULAR; INTRALESIONAL; INTRAMUSCULAR; INTRAVENOUS; SOFT TISSUE
Status: DISCONTINUED | OUTPATIENT
Start: 2020-08-05 | End: 2020-08-05

## 2020-08-05 RX ORDER — LIDOCAINE HYDROCHLORIDE 10 MG/ML
1 INJECTION, SOLUTION EPIDURAL; INFILTRATION; INTRACAUDAL; PERINEURAL ONCE
Status: DISCONTINUED | OUTPATIENT
Start: 2020-08-05 | End: 2020-08-05

## 2020-08-05 RX ORDER — SODIUM CHLORIDE 9 MG/ML
INJECTION, SOLUTION INTRAVENOUS CONTINUOUS
Status: DISCONTINUED | OUTPATIENT
Start: 2020-08-05 | End: 2020-08-05

## 2020-08-05 RX ADMIN — CEFAZOLIN SODIUM 2 G: 2 SOLUTION INTRAVENOUS at 04:08

## 2020-08-05 RX ADMIN — KETOROLAC TROMETHAMINE 30 MG: 30 INJECTION, SOLUTION INTRAMUSCULAR at 09:08

## 2020-08-05 RX ADMIN — MIDAZOLAM HYDROCHLORIDE 2 MG: 1 INJECTION, SOLUTION INTRAMUSCULAR; INTRAVENOUS at 07:08

## 2020-08-05 RX ADMIN — ONDANSETRON 4 MG: 2 INJECTION INTRAMUSCULAR; INTRAVENOUS at 08:08

## 2020-08-05 RX ADMIN — MEPERIDINE HYDROCHLORIDE 50 MG: 50 INJECTION INTRAMUSCULAR; INTRAVENOUS; SUBCUTANEOUS at 09:08

## 2020-08-05 RX ADMIN — MEPERIDINE HYDROCHLORIDE 50 MG: 50 INJECTION INTRAMUSCULAR; INTRAVENOUS; SUBCUTANEOUS at 07:08

## 2020-08-05 RX ADMIN — SUGAMMADEX 200 MG: 100 INJECTION, SOLUTION INTRAVENOUS at 09:08

## 2020-08-05 RX ADMIN — SIMETHICONE CHEW TAB 80 MG 80 MG: 80 TABLET ORAL at 07:08

## 2020-08-05 RX ADMIN — HYDROMORPHONE HYDROCHLORIDE 1 MG: 2 INJECTION, SOLUTION INTRAMUSCULAR; INTRAVENOUS; SUBCUTANEOUS at 11:08

## 2020-08-05 RX ADMIN — SODIUM CHLORIDE, SODIUM LACTATE, POTASSIUM CHLORIDE, AND CALCIUM CHLORIDE: .6; .31; .03; .02 INJECTION, SOLUTION INTRAVENOUS at 07:08

## 2020-08-05 RX ADMIN — METHENAMINE, SODIUM PHOSPHATE, MONOBASIC, METHYLENE BLUE, AND HYOSCYAMINE SULFATE 1 TABLET: 81.6; 40.8; 10.8; .12 TABLET, COATED ORAL at 07:08

## 2020-08-05 RX ADMIN — PROMETHAZINE HYDROCHLORIDE 12.5 MG: 25 INJECTION INTRAMUSCULAR; INTRAVENOUS at 03:08

## 2020-08-05 RX ADMIN — MEPERIDINE HYDROCHLORIDE 12.5 MG: 50 INJECTION INTRAMUSCULAR; INTRAVENOUS; SUBCUTANEOUS at 10:08

## 2020-08-05 RX ADMIN — ACETAMINOPHEN 1000 MG: 10 INJECTION, SOLUTION INTRAVENOUS at 02:08

## 2020-08-05 RX ADMIN — CEFAZOLIN SODIUM 2 G: 2 SOLUTION INTRAVENOUS at 07:08

## 2020-08-05 RX ADMIN — GLYCOPYRROLATE 0.4 MG: 0.2 INJECTION INTRAMUSCULAR; INTRAVENOUS at 08:08

## 2020-08-05 RX ADMIN — ROCURONIUM BROMIDE 20 MG: 10 INJECTION, SOLUTION INTRAVENOUS at 08:08

## 2020-08-05 RX ADMIN — SUCCINYLCHOLINE CHLORIDE 100 MG: 20 INJECTION, SOLUTION INTRAMUSCULAR; INTRAVENOUS at 07:08

## 2020-08-05 RX ADMIN — PROPOFOL 200 MG: 10 INJECTION, EMULSION INTRAVENOUS at 07:08

## 2020-08-05 RX ADMIN — SODIUM CHLORIDE, SODIUM LACTATE, POTASSIUM CHLORIDE, AND CALCIUM CHLORIDE: .6; .31; .03; .02 INJECTION, SOLUTION INTRAVENOUS at 11:08

## 2020-08-05 RX ADMIN — ACETAMINOPHEN 1000 MG: 10 INJECTION, SOLUTION INTRAVENOUS at 09:08

## 2020-08-05 RX ADMIN — DEXAMETHASONE SODIUM PHOSPHATE 4 MG: 4 INJECTION, SOLUTION INTRAMUSCULAR; INTRAVENOUS at 08:08

## 2020-08-05 RX ADMIN — KETOROLAC TROMETHAMINE 30 MG: 30 INJECTION, SOLUTION INTRAMUSCULAR at 04:08

## 2020-08-05 RX ADMIN — ROCURONIUM BROMIDE 30 MG: 10 INJECTION, SOLUTION INTRAVENOUS at 08:08

## 2020-08-05 RX ADMIN — HYDROMORPHONE HYDROCHLORIDE 1 MG: 2 INJECTION, SOLUTION INTRAMUSCULAR; INTRAVENOUS; SUBCUTANEOUS at 07:08

## 2020-08-05 RX ADMIN — SODIUM CHLORIDE, POTASSIUM CHLORIDE, SODIUM LACTATE AND CALCIUM CHLORIDE: 600; 310; 30; 20 INJECTION, SOLUTION INTRAVENOUS at 07:08

## 2020-08-05 RX ADMIN — SODIUM CHLORIDE, POTASSIUM CHLORIDE, SODIUM LACTATE AND CALCIUM CHLORIDE: 600; 310; 30; 20 INJECTION, SOLUTION INTRAVENOUS at 08:08

## 2020-08-05 NOTE — TRANSFER OF CARE
"Anesthesia Transfer of Care Note    Patient: Helen Freeman    Procedure(s) Performed: Procedure(s) (LRB):  HYSTERECTOMY, TOTAL, LAPAROSCOPIC (N/A)  SALPINGECTOMY, LAPAROSCOPIC (Bilateral)    Patient location: PACU    Anesthesia Type: general    Transport from OR: Transported from OR on room air with adequate spontaneous ventilation    Post pain: adequate analgesia    Post assessment: no apparent anesthetic complications and tolerated procedure well    Post vital signs: stable    Level of consciousness: awake, alert and oriented    Nausea/Vomiting: no nausea/vomiting    Complications: none    Transfer of care protocol was followed      Last vitals:   Visit Vitals  /79   Pulse 110   Temp 37.1 °C (98.8 °F) (Oral)   Resp 18   Ht 5' 5" (1.651 m)   Wt 68.5 kg (151 lb)   SpO2 98%   Breastfeeding No   BMI 25.13 kg/m²     "

## 2020-08-05 NOTE — PLAN OF CARE
08/05/20 1200   Discharge Assessment   Assessment Type Discharge Planning Assessment   Confirmed/corrected address and phone number on facesheet? Yes   Assessment information obtained from? Patient   Expected Length of Stay (days) 1   Communicated expected length of stay with patient/caregiver yes   Prior to hospitilization cognitive status: Alert/Oriented   Prior to hospitalization functional status: Independent   Current cognitive status: Alert/Oriented   Current Functional Status: Needs Assistance   Lives With spouse;child(sandor), adult;child(sandor), dependent   Able to Return to Prior Arrangements yes   Is patient able to care for self after discharge? Yes   Who are your caregiver(s) and their phone number(s)? Hussein Freeman spouse [phone]   Patient's perception of discharge disposition home or selfcare   Readmission Within the Last 30 Days no previous admission in last 30 days   Patient currently being followed by outpatient case management? No   Patient currently receives any other outside agency services? No   Equipment Currently Used at Home none   Do you have any problems affording any of your prescribed medications? No   Is the patient taking medications as prescribed? yes   Does the patient have transportation home? Yes   Transportation Anticipated family or friend will provide   Does the patient receive services at the Coumadin Clinic? No   Discharge Plan A Home with family   DME Needed Upon Discharge  none   Patient/Family in Agreement with Plan yes   Patient lives at home with her spouse & her 2 kids ages 17 & 19. She is independent at home.  Plan is to stay the night & go home in the morning. Denies any needs at this time. Spouse at bedside.

## 2020-08-05 NOTE — ANESTHESIA POSTPROCEDURE EVALUATION
Anesthesia Post Evaluation    Patient: Helen Freeman    Procedure(s) Performed: Procedure(s) (LRB):  HYSTERECTOMY, TOTAL, LAPAROSCOPIC (N/A)  SALPINGECTOMY, LAPAROSCOPIC (Bilateral)    Final Anesthesia Type: general    Patient location during evaluation: PACU  Patient participation: Yes- Able to Participate  Level of consciousness: awake and awake and alert  Post-procedure vital signs: reviewed and stable  Pain management: adequate  Airway patency: patent    PONV status at discharge: No PONV  Anesthetic complications: no      Cardiovascular status: blood pressure returned to baseline  Respiratory status: unassisted and spontaneous ventilation  Hydration status: euvolemic  Follow-up not needed.          Vitals Value Taken Time   /75 08/05/20 1046   Temp 36.1 °C (96.9 °F) 08/05/20 0949   Pulse 92 08/05/20 1047   Resp 29 08/05/20 1047   SpO2 98 % 08/05/20 1047   Vitals shown include unvalidated device data.      No case tracking events are documented in the log.      Pain/Marissa Score: Pain Rating Prior to Med Admin: 5 (8/5/2020  2:08 PM)  Pain Rating Post Med Admin: 0 (8/5/2020  3:00 PM)  Marissa Score: 9 (8/5/2020 10:35 AM)

## 2020-08-05 NOTE — BRIEF OP NOTE
[facility] Periop Services  Brief Operative Note    Surgery Date: 8/5/2020     Surgeon(s) and Role:     * Salomon Claros MD - Primary     * Maryanne Servin MD - Assisting        Pre-op Diagnosis:  S/P endometrial ablation [Z98.890]  Severe dysmenorrhea [N94.6]  Intramural leiomyoma of uterus [D25.1]    Post-op Diagnosis:  Post-Op Diagnosis Codes:     * S/P endometrial ablation [Z98.890]     * Severe dysmenorrhea [N94.6]     * Intramural leiomyoma of uterus [D25.1]    Procedure(s) (LRB):  HYSTERECTOMY, TOTAL, LAPAROSCOPIC (N/A)  SALPINGECTOMY, LAPAROSCOPIC (Bilateral)    Anesthesia: General    Description of the findings of the procedure(s):  During laparoscopy:  The patient had a previous bilateral tubal ligation.  There was minimal pelvic adhesive disease.  There was no evidence of endometriosis.  Ovaries appeared within normal limits.  There was a small area of omentum adhesed to the anterior abdominal wall.  The appendix and liver edge appeared within normal limits.  During cystoscopy:  Both ureters were noted to be extruding clear urine.  No evidence of bladder injury.  No lesions noted in the bladder.    Estimated Blood Loss: 50 mL         Specimens: uterus tubes        Discharge Note    OUTCOME: Patient tolerated treatment/procedure well without complication and is now ready for discharge.    DISPOSITION: Home or Self Care    FINAL DIAGNOSIS:  menorraghia    FOLLOWUP: In clinic    DISCHARGE INSTRUCTIONS:  Follow-up in 2 weeks.  No sex tampons or douching.  Patient given prescriptions for Percocet and ibuprofen.  Patient is to use over-the-counter medications for constipation..

## 2020-08-05 NOTE — NURSING
1100-Patient received to room 146 from PACU. In stable condition. Oriented to room and call-light--LW.

## 2020-08-05 NOTE — PLAN OF CARE
PT TO RECOVERY FROM OR , PT CONNECTED TO MONITOR, IV INTACT,AND INFUSING,  PT HAS LUKE CATH SECURED TO LEFT THIGH, DRAINAGE CLEAR Y,ELLOW IN COLOR, 3 INCISION SITED NOTED TO ABDOMEN, 2 SCRATCHES NOTED TO ABDOMEN ABOVE MID ABDOMINAL INCISION. WARMING MEASURES IN PLACE. VITALS STABLE, WILL CONTINUE TO MONITOR

## 2020-08-05 NOTE — ANESTHESIA PROCEDURE NOTES
Intubation  Performed by: Rudolph Young CRNA  Authorized by: Kayden Tenorio MD     Intubation:     Induction:  Rapid sequence induction    Intubated:  Postinduction    Mask Ventilation:  Easy mask    Attempts:  1    Attempted By:  CRNA    Method of Intubation:  Direct    Blade:  Alfred 3    Laryngeal View Grade: Grade I - full view of chords      Difficult Airway Encountered?: No      Complications:  None    Airway Device:  Oral endotracheal tube    Airway Device Size:  7.0    Style/Cuff Inflation:  Cuffed    Inflation Amount (mL):  2    Tube secured:  23    Secured at:  The teeth    Placement Verified By:  Capnometry    Complicating Factors:  None    Findings Post-Intubation:  BS equal bilateral  Notes:      Poor dentition with multiple loose teeth

## 2020-08-05 NOTE — PLAN OF CARE
PT TOLERATING PO FLUIDS, NADN PAIN 7/10  MEDS PREVIOUSLY GIVEN , SEE MAR, WILL CONTINUE TO MONTIOR

## 2020-08-05 NOTE — ANESTHESIA PREPROCEDURE EVALUATION
08/05/2020  Helen Freeman is a 44 y.o., female.    Anesthesia Evaluation    I have reviewed the Patient Summary Reports.    I have reviewed the Nursing Notes.    I have reviewed the Medications.     Review of Systems  Anesthesia Hx:  No problems with previous Anesthesia  Neg history of prior surgery. Denies Family Hx of Anesthesia complications.   Denies Personal Hx of Anesthesia complications.   Social:  Smoker    Hematology/Oncology:  Hematology Normal   Oncology Normal     EENT/Dental:EENT/Dental Normal   Cardiovascular:   Hypertension, well controlled    Pulmonary:  Pulmonary Normal    Renal/:  Renal/ Normal     Hepatic/GI:  Hepatic/GI Normal    Musculoskeletal:  Musculoskeletal Normal    Neurological:   Headaches    Endocrine:   Hypothyroidism    Dermatological:  Skin Normal    Psych:  Psychiatric Normal           Physical Exam  General:  Well nourished    Airway/Jaw/Neck:  Airway Findings: Mouth Opening: Normal Tongue: Normal  General Airway Assessment: Adult  Mallampati: II  TM Distance: 4 - 6 cm        Eyes/Ears/Nose:  EYES/EARS/NOSE FINDINGS: Normal   Dental:  DENTAL FINDINGS: Normal   Chest/Lungs:  Chest/Lungs Clear    Heart/Vascular:  Heart Findings: Normal Heart murmur: negative Vascular Findings: Normal    Abdomen:  Abdomen Findings: Normal    Musculoskeletal:  Musculoskeletal Findings: Normal   Skin:  Skin Findings: Normal         Anesthesia Plan  Type of Anesthesia, risks & benefits discussed:  Anesthesia Type:  general  Patient's Preference:   Intra-op Monitoring Plan: standard ASA monitors  Intra-op Monitoring Plan Comments:   Post Op Pain Control Plan:   Post Op Pain Control Plan Comments:   Induction:   IV  Beta Blocker:  Patient is not currently on a Beta-Blocker (No further documentation required).       Informed Consent: Patient understands risks and agrees with Anesthesia plan.   Questions answered. Anesthesia consent signed with patient.  ASA Score: 2     Day of Surgery Review of History & Physical: I have interviewed and examined the patient. I have reviewed the patient's H&P dated:    H&P update referred to the provider.         Ready For Surgery From Anesthesia Perspective.

## 2020-08-05 NOTE — INTERVAL H&P NOTE
The patient has been examined and the H&P has been reviewed:    I concur with the findings and no changes have occurred since H&P was written.    Surgery risks, benefits and alternative options discussed and understood by patient/family.          Active Hospital Problems    Diagnosis  POA    Well woman exam with routine gynecological exam [Z01.419]  Not Applicable      Resolved Hospital Problems   No resolved problems to display.

## 2020-08-05 NOTE — OP NOTE
[facility] - Periop Services  Surgery Department  Operative Note    SUMMARY     Date of Procedure: 8/5/2020     Procedure: Procedure(s) (LRB):  HYSTERECTOMY, TOTAL, LAPAROSCOPIC (N/A)  SALPINGECTOMY, LAPAROSCOPIC (Bilateral)     Surgeon(s) and Role:     * Salomon Claros MD - Primary     * Maryanne Servin MD - Assisting        Pre-Operative Diagnosis: S/P endometrial ablation [Z98.890]  Severe dysmenorrhea [N94.6]  Intramural leiomyoma of uterus [D25.1]    Post-Operative Diagnosis: Post-Op Diagnosis Codes:     * S/P endometrial ablation [Z98.890]     * Severe dysmenorrhea [N94.6]     * Intramural leiomyoma of uterus [D25.1]    Anesthesia: General    Technical Procedures Used: After ensuring informed consent, the patient was taken to the operating room, where general anesthesia was initiated.  She was placed in the adjustable Francisco stirrups, and her abdomen and perineum were prepped and draped in the usual sterile fashion.  A Mace catheter was placed in the bladder.  The anterior lip of the cervix was grasped with a single-toothed tenaculum.  The patient sounded to 10 cm.  She was gently dilated.  A #10  Fornesse with a 3.5 cup was introduced into the vagina.    Gloves were changed and attention was turned to the patient's abdomen. A Veress needle was placed intra umbilicaly After 2 towel clamps were placed periumbilically.  The abdomen was insufflated to 15 mm of mercury.   The above findings were noted. A 10 mm incision was made in the umbilicus.  A 10 mm trocar and sleeve were advanced. Intraabdominal placement was assured with direct laparoscopic visualization.  Then 5 mm trocars were placed in the right and left lower quadrant.  The round ligament was grasped with the with the LigaSure device.  The anterior and posterior leaf of the broad ligament were opened.  Next, the right utero-ovarian was grasped with the LigaSure and cauterized and transected and the left utero-ovarian ligament  was cauterized and transected.  The right tube was removed and the left tube  were removed.  The broad ligament was taken down.  This was performed bilaterally.  The posterior leaf of the broad ligament was taken down.  The bladder flap was taken down with the use of the Bovie electric artery The uterine arteries were grasped, cauterized, and transected.  The same procedure was performed on the left side.    Next, after both uterine arteries were cauterized, the bladder flap was taken down, and an anterior colpotomy incision was made. Additional bites were made to cauterize the right and left uterine artery,and then the posterior colpotomy incision was made.  The uterus was removedvaginally.  Attention was then turned to the vagina.    The right and left vaginal angles were identified, and a 0 Vicryl was placed on both sides and held for later use.  The anterior and posterior vaginal mucosa were reapproximated in an interrupted fashion with 0 pop offs, and then the vaginal mucosa was imbricated with a 0 Vicryl.  The uterosacrals were plicated to aid in the prevention of a future enterocele.  Next, 60 mL of sterile water was placed inside the bladder.  The Mace catheter was removed, and cystoscopy was performed.  The above findings were noted.   Next, Mace was replaced.  Gloves were changed. Attention was turned to the patient's abdomen.  Copious irrigation was used in the abdomen.  All surgical sites were inspected and noted to be hemostatic.  Next, all the trocars were removed from the patient's abdomen. Positive-pressure ventilation was used to help aid in the removal of the pneumoperitoneum.  A  0 Vicryl was used to reapproximate the fascia of the 10 mm trocar site in the umbilicus, and then the skin was closed with 4.0 vicryl. The right lower and left lower 5 mm trocar sites were closed with 4.0 vicyrl.   The patient was taken out of the adjustable Francisco stirrups and placed in the supine position.  She was  awakened from anesthesia and taken to the recovery room with no complications.     Description of the Findings of the Procedure:During laparoscopy:  The patient had a previous bilateral tubal ligation.  There was minimal pelvic adhesive disease.  There was no evidence of endometriosis.  Ovaries appeared within normal limits.  There was a small area of omentum adhesed to the anterior abdominal wall.  The appendix and liver edge appeared within normal limits.  During cystoscopy:  Both ureters were noted to be extruding clear urine.  No evidence of bladder injury.  No lesions noted in the bladder.    Significant Surgical Tasks Conducted by the Assistant(s), if Applicable: na    Complications: No    Estimated Blood Loss (EBL): 50 mL           Implants: * No implants in log *    Specimens: uterus,tubes              Condition: Good    Disposition: PACU - hemodynamically stable.    Attestation: I was present and scrubbed for the entire procedure.

## 2020-08-06 VITALS
BODY MASS INDEX: 25.16 KG/M2 | TEMPERATURE: 99 F | HEART RATE: 73 BPM | HEIGHT: 65 IN | DIASTOLIC BLOOD PRESSURE: 63 MMHG | SYSTOLIC BLOOD PRESSURE: 116 MMHG | OXYGEN SATURATION: 99 % | WEIGHT: 151 LBS | RESPIRATION RATE: 16 BRPM

## 2020-08-06 LAB
ANION GAP SERPL CALC-SCNC: 9 MMOL/L (ref 8–16)
BASOPHILS # BLD AUTO: 0.02 K/UL (ref 0–0.2)
BASOPHILS NFR BLD: 0.2 % (ref 0–1.9)
BUN SERPL-MCNC: 10 MG/DL (ref 6–20)
CALCIUM SERPL-MCNC: 7.9 MG/DL (ref 8.7–10.5)
CHLORIDE SERPL-SCNC: 112 MMOL/L (ref 95–110)
CO2 SERPL-SCNC: 17 MMOL/L (ref 23–29)
CREAT SERPL-MCNC: 0.6 MG/DL (ref 0.5–1.4)
DIFFERENTIAL METHOD: ABNORMAL
EOSINOPHIL # BLD AUTO: 0.1 K/UL (ref 0–0.5)
EOSINOPHIL NFR BLD: 1.1 % (ref 0–8)
ERYTHROCYTE [DISTWIDTH] IN BLOOD BY AUTOMATED COUNT: 12.1 % (ref 11.5–14.5)
EST. GFR  (AFRICAN AMERICAN): >60 ML/MIN/1.73 M^2
EST. GFR  (NON AFRICAN AMERICAN): >60 ML/MIN/1.73 M^2
GLUCOSE SERPL-MCNC: 76 MG/DL (ref 70–110)
HCT VFR BLD AUTO: 40.1 % (ref 37–48.5)
HGB BLD-MCNC: 13.4 G/DL (ref 12–16)
IMM GRANULOCYTES # BLD AUTO: 0.04 K/UL (ref 0–0.04)
IMM GRANULOCYTES NFR BLD AUTO: 0.4 % (ref 0–0.5)
LYMPHOCYTES # BLD AUTO: 1.7 K/UL (ref 1–4.8)
LYMPHOCYTES NFR BLD: 15.6 % (ref 18–48)
MCH RBC QN AUTO: 30.8 PG (ref 27–31)
MCHC RBC AUTO-ENTMCNC: 33.4 G/DL (ref 32–36)
MCV RBC AUTO: 92 FL (ref 82–98)
MONOCYTES # BLD AUTO: 1.1 K/UL (ref 0.3–1)
MONOCYTES NFR BLD: 9.7 % (ref 4–15)
NEUTROPHILS # BLD AUTO: 7.9 K/UL (ref 1.8–7.7)
NEUTROPHILS NFR BLD: 73 % (ref 38–73)
NRBC BLD-RTO: 0 /100 WBC
PLATELET # BLD AUTO: 119 K/UL (ref 150–350)
PMV BLD AUTO: 9.8 FL (ref 9.2–12.9)
POTASSIUM SERPL-SCNC: 3.6 MMOL/L (ref 3.5–5.1)
RBC # BLD AUTO: 4.35 M/UL (ref 4–5.4)
SODIUM SERPL-SCNC: 138 MMOL/L (ref 136–145)
WBC # BLD AUTO: 10.8 K/UL (ref 3.9–12.7)

## 2020-08-06 PROCEDURE — 85025 COMPLETE CBC W/AUTO DIFF WBC: CPT

## 2020-08-06 PROCEDURE — 36415 COLL VENOUS BLD VENIPUNCTURE: CPT

## 2020-08-06 PROCEDURE — 63600175 PHARM REV CODE 636 W HCPCS: Performed by: OBSTETRICS & GYNECOLOGY

## 2020-08-06 PROCEDURE — 80048 BASIC METABOLIC PNL TOTAL CA: CPT

## 2020-08-06 RX ORDER — IBUPROFEN 800 MG/1
800 TABLET ORAL EVERY 8 HOURS
Qty: 30 TABLET | Refills: 1 | Status: SHIPPED | OUTPATIENT
Start: 2020-08-06 | End: 2021-06-28 | Stop reason: ALTCHOICE

## 2020-08-06 RX ORDER — OXYCODONE AND ACETAMINOPHEN 10; 325 MG/1; MG/1
1 TABLET ORAL EVERY 6 HOURS PRN
Qty: 28 TABLET | Refills: 0 | Status: SHIPPED | OUTPATIENT
Start: 2020-08-06 | End: 2021-06-28 | Stop reason: ALTCHOICE

## 2020-08-06 RX ADMIN — SODIUM CHLORIDE, SODIUM LACTATE, POTASSIUM CHLORIDE, AND CALCIUM CHLORIDE: .6; .31; .03; .02 INJECTION, SOLUTION INTRAVENOUS at 03:08

## 2020-08-06 RX ADMIN — CEFAZOLIN SODIUM 2 G: 2 SOLUTION INTRAVENOUS at 08:08

## 2020-08-06 RX ADMIN — CEFAZOLIN SODIUM 2 G: 2 SOLUTION INTRAVENOUS at 12:08

## 2020-08-06 RX ADMIN — ENOXAPARIN SODIUM 40 MG: 40 INJECTION SUBCUTANEOUS at 08:08

## 2020-08-06 RX ADMIN — KETOROLAC TROMETHAMINE 30 MG: 30 INJECTION, SOLUTION INTRAMUSCULAR at 04:08

## 2020-08-06 RX ADMIN — ACETAMINOPHEN 1000 MG: 10 INJECTION, SOLUTION INTRAVENOUS at 05:08

## 2020-08-06 RX ADMIN — KETOROLAC TROMETHAMINE 30 MG: 30 INJECTION, SOLUTION INTRAMUSCULAR at 10:08

## 2020-08-06 NOTE — PLAN OF CARE
08/06/20 0956   Final Note   Assessment Type Final Discharge Note   Anticipated Discharge Disposition Home   What phone number can be called within the next 1-3 days to see how you are doing after discharge? [phone]   Hospital Follow Up  Appt(s) scheduled? Yes   Discharge plans and expectations educations in teach back method with documentation complete? Yes   Patient provided with follow up appointment with Dr Servin. Demonstrated understanding by verbal feedback. Denies any needs at this time.

## 2020-08-06 NOTE — NURSING
0500: Pt ambulated to the bathroom following removal of vale catheter. Pt voided 175 mL of clear yellow urine without difficulty. Pt ambulated back to bed with minimal assistance. Pt denies any further needs at this time. Will continue to monitor and assess.

## 2020-08-06 NOTE — NURSING
Tordol given sivp and heplock d/c'd.  Discharge and follow up instructions given to pt and pt voiced no questions or concerns.

## 2020-08-06 NOTE — DISCHARGE INSTRUCTIONS
Discharge Instructions for Laparoscopic Hysterectomy  You had a procedure called laparoscopic hysterectomy. A surgeon removed your uterus using instruments inserted through small incisions in your abdomen. These incisions may be tender or sore. You may also have pain in your upper back or shoulders. This is from the gas used to enlarge your abdomen to allow your doctor to see inside your pelvis and perform the procedure. This pain usually goes away in a day or two. It usually takes from 1 to 4 weeks to recover from laparoscopic hysterectomy. Remember, though, that recovery time varies from woman to woman. Here's what you can do to speed your recovery following surgery.  Home care   · Continue the coughing and deep breathing exercises that you learned in the hospital.  · Take your medications exactly as directed by your doctor.  · Avoid constipation.  ¨ Eat fruits, vegetables, and whole grains.  ¨ Drink 6 to 8 glasses of water a day, unless told to do otherwise.  ¨ Use a laxative or a mild stool softener if your doctor says it's OK.  · Shower as usual. Wash your incisions with mild soap and water. Pat dry.  · Don't use oils, powders, or lotions on your incisions.  · Don't put anything in your vagina until your doctor says it's safe to do so. Don't use tampons or douches. Don't have sex.  · If you had both ovaries removed, report hot flashes, mood swings, and irritability to your doctor. There may be medications that can help you.  Activity  · Ask your doctor when you can start driving again. It's usually okay to drive as soon as you are free of pain and able to move comfortably from side to side. Don't drive while you are still taking opioid pain medications.  · Ask others to help with chores and errands while you recover.  · Dont lift anything heavier than 10 pounds for 6 weeks.  · Dont vacuum or do other strenuous activities until the doctor says it's OK.  · Walk as often as you feel able.  · Don't drive for a  few days after the surgery. You may drive as soon as you are able to move comfortably from side to side and when you are no longer taking narcotics.  · Climb stairs slowly and pause after every few steps.  Follow-up care  Make a follow-up appointment as directed by our staff.     When to call your doctor  Call your doctor right away if you have any of the following:  · Fever above 100.4°F (38°C) or chills  · Bright red vaginal bleeding or vaginal bleeding that soaks more than one sanitary pad per hour  · A foul smelling discharge from the vagina  · Trouble urinating or burning when you urinate  · Severe pain or bloating in your abdomen  · Redness, swelling, or drainage at your incision sites  · Shortness of breath or chest pain  · Nausea and vomiting   Date Last Reviewed: 5/19/2015  © 8718-9218 [employer] [address] [place] [id number]. All rights reserved. This information is not intended as a substitute for professional medical care. Always follow your healthcare professional's instructions.

## 2020-08-06 NOTE — NURSING
Pt up to bathroom to void.  Voided clear yellow urine without difficulty.  Ambulating well without assistance.

## 2020-08-11 LAB
FINAL PATHOLOGIC DIAGNOSIS: NORMAL
GROSS: NORMAL

## 2021-06-28 ENCOUNTER — HOSPITAL ENCOUNTER (EMERGENCY)
Facility: HOSPITAL | Age: 46
Discharge: ANOTHER HOSPITAL | End: 2021-06-28
Attending: EMERGENCY MEDICINE
Payer: COMMERCIAL

## 2021-06-28 VITALS
SYSTOLIC BLOOD PRESSURE: 94 MMHG | HEART RATE: 65 BPM | HEIGHT: 64 IN | WEIGHT: 150 LBS | BODY MASS INDEX: 25.61 KG/M2 | TEMPERATURE: 98 F | RESPIRATION RATE: 18 BRPM | OXYGEN SATURATION: 94 % | DIASTOLIC BLOOD PRESSURE: 62 MMHG

## 2021-06-28 DIAGNOSIS — R42 POSTURAL DIZZINESS WITH PRESYNCOPE: ICD-10-CM

## 2021-06-28 DIAGNOSIS — R55 POSTURAL DIZZINESS WITH PRESYNCOPE: ICD-10-CM

## 2021-06-28 LAB
ALBUMIN SERPL BCP-MCNC: 4.6 G/DL (ref 3.5–5.2)
ALP SERPL-CCNC: 63 U/L (ref 55–135)
ALT SERPL W/O P-5'-P-CCNC: 20 U/L (ref 10–44)
AMPHET+METHAMPHET UR QL: NEGATIVE
ANION GAP SERPL CALC-SCNC: 13 MMOL/L (ref 8–16)
AST SERPL-CCNC: 14 U/L (ref 10–40)
BARBITURATES UR QL SCN>200 NG/ML: NEGATIVE
BASOPHILS # BLD AUTO: 0.04 K/UL (ref 0–0.2)
BASOPHILS NFR BLD: 0.4 % (ref 0–1.9)
BENZODIAZ UR QL SCN>200 NG/ML: NEGATIVE
BILIRUB SERPL-MCNC: 0.4 MG/DL (ref 0.1–1)
BILIRUB UR QL STRIP: NEGATIVE
BUN SERPL-MCNC: 13 MG/DL (ref 6–20)
BZE UR QL SCN: NEGATIVE
CALCIUM SERPL-MCNC: 9.8 MG/DL (ref 8.7–10.5)
CANNABINOIDS UR QL SCN: NEGATIVE
CHLORIDE SERPL-SCNC: 109 MMOL/L (ref 95–110)
CLARITY UR: CLEAR
CO2 SERPL-SCNC: 17 MMOL/L (ref 23–29)
COLOR UR: YELLOW
CREAT SERPL-MCNC: 0.7 MG/DL (ref 0.5–1.4)
CREAT UR-MCNC: 48.7 MG/DL (ref 15–325)
DIFFERENTIAL METHOD: ABNORMAL
EOSINOPHIL # BLD AUTO: 0.1 K/UL (ref 0–0.5)
EOSINOPHIL NFR BLD: 0.7 % (ref 0–8)
ERYTHROCYTE [DISTWIDTH] IN BLOOD BY AUTOMATED COUNT: 13.2 % (ref 11.5–14.5)
EST. GFR  (AFRICAN AMERICAN): >60 ML/MIN/1.73 M^2
EST. GFR  (NON AFRICAN AMERICAN): >60 ML/MIN/1.73 M^2
GLUCOSE SERPL-MCNC: 83 MG/DL (ref 70–110)
GLUCOSE UR QL STRIP: NEGATIVE
HCT VFR BLD AUTO: 50 % (ref 37–48.5)
HGB BLD-MCNC: 17.4 G/DL (ref 12–16)
HGB UR QL STRIP: NEGATIVE
IMM GRANULOCYTES # BLD AUTO: 0.04 K/UL (ref 0–0.04)
IMM GRANULOCYTES NFR BLD AUTO: 0.4 % (ref 0–0.5)
KETONES UR QL STRIP: ABNORMAL
LEUKOCYTE ESTERASE UR QL STRIP: NEGATIVE
LYMPHOCYTES # BLD AUTO: 2.2 K/UL (ref 1–4.8)
LYMPHOCYTES NFR BLD: 20.4 % (ref 18–48)
MCH RBC QN AUTO: 32.2 PG (ref 27–31)
MCHC RBC AUTO-ENTMCNC: 34.8 G/DL (ref 32–36)
MCV RBC AUTO: 93 FL (ref 82–98)
METHADONE UR QL SCN>300 NG/ML: NEGATIVE
MONOCYTES # BLD AUTO: 0.8 K/UL (ref 0.3–1)
MONOCYTES NFR BLD: 7.2 % (ref 4–15)
NEUTROPHILS # BLD AUTO: 7.6 K/UL (ref 1.8–7.7)
NEUTROPHILS NFR BLD: 70.9 % (ref 38–73)
NITRITE UR QL STRIP: NEGATIVE
NRBC BLD-RTO: 0 /100 WBC
OPIATES UR QL SCN: NEGATIVE
PCP UR QL SCN>25 NG/ML: NEGATIVE
PH UR STRIP: 5 [PH] (ref 5–8)
PLATELET # BLD AUTO: 162 K/UL (ref 150–450)
PMV BLD AUTO: 10.2 FL (ref 9.2–12.9)
POTASSIUM SERPL-SCNC: 4.2 MMOL/L (ref 3.5–5.1)
PROT SERPL-MCNC: 7.3 G/DL (ref 6–8.4)
PROT UR QL STRIP: NEGATIVE
RBC # BLD AUTO: 5.4 M/UL (ref 4–5.4)
SARS-COV-2 RDRP RESP QL NAA+PROBE: NEGATIVE
SODIUM SERPL-SCNC: 139 MMOL/L (ref 136–145)
SP GR UR STRIP: 1.01 (ref 1–1.03)
TOXICOLOGY INFORMATION: NORMAL
TSH SERPL DL<=0.005 MIU/L-ACNC: 0.74 UIU/ML (ref 0.4–4)
URN SPEC COLLECT METH UR: ABNORMAL
UROBILINOGEN UR STRIP-ACNC: NEGATIVE EU/DL
WBC # BLD AUTO: 10.76 K/UL (ref 3.9–12.7)

## 2021-06-28 PROCEDURE — 85025 COMPLETE CBC W/AUTO DIFF WBC: CPT | Performed by: EMERGENCY MEDICINE

## 2021-06-28 PROCEDURE — 81003 URINALYSIS AUTO W/O SCOPE: CPT | Mod: 59 | Performed by: EMERGENCY MEDICINE

## 2021-06-28 PROCEDURE — 63600175 PHARM REV CODE 636 W HCPCS: Performed by: EMERGENCY MEDICINE

## 2021-06-28 PROCEDURE — 80053 COMPREHEN METABOLIC PANEL: CPT | Performed by: EMERGENCY MEDICINE

## 2021-06-28 PROCEDURE — 93010 EKG 12-LEAD: ICD-10-PCS | Mod: ,,, | Performed by: INTERNAL MEDICINE

## 2021-06-28 PROCEDURE — 99285 EMERGENCY DEPT VISIT HI MDM: CPT | Mod: 25

## 2021-06-28 PROCEDURE — 96374 THER/PROPH/DIAG INJ IV PUSH: CPT

## 2021-06-28 PROCEDURE — 70450 CT HEAD/BRAIN W/O DYE: CPT | Mod: 26,,, | Performed by: RADIOLOGY

## 2021-06-28 PROCEDURE — U0002 COVID-19 LAB TEST NON-CDC: HCPCS | Performed by: EMERGENCY MEDICINE

## 2021-06-28 PROCEDURE — 80307 DRUG TEST PRSMV CHEM ANLYZR: CPT | Performed by: EMERGENCY MEDICINE

## 2021-06-28 PROCEDURE — 70450 CT HEAD WITHOUT CONTRAST: ICD-10-PCS | Mod: 26,,, | Performed by: RADIOLOGY

## 2021-06-28 PROCEDURE — 93010 ELECTROCARDIOGRAM REPORT: CPT | Mod: ,,, | Performed by: INTERNAL MEDICINE

## 2021-06-28 PROCEDURE — 93005 ELECTROCARDIOGRAM TRACING: CPT

## 2021-06-28 PROCEDURE — 70450 CT HEAD/BRAIN W/O DYE: CPT | Mod: TC

## 2021-06-28 PROCEDURE — 84443 ASSAY THYROID STIM HORMONE: CPT | Performed by: EMERGENCY MEDICINE

## 2021-06-28 PROCEDURE — 25000003 PHARM REV CODE 250: Performed by: EMERGENCY MEDICINE

## 2021-06-28 RX ORDER — ACETAMINOPHEN 325 MG/1
650 TABLET ORAL
Status: COMPLETED | OUTPATIENT
Start: 2021-06-28 | End: 2021-06-28

## 2021-06-28 RX ORDER — ONDANSETRON 2 MG/ML
4 INJECTION INTRAMUSCULAR; INTRAVENOUS
Status: COMPLETED | OUTPATIENT
Start: 2021-06-28 | End: 2021-06-28

## 2021-06-28 RX ADMIN — ACETAMINOPHEN 650 MG: 325 TABLET ORAL at 07:06

## 2021-06-28 RX ADMIN — ONDANSETRON HYDROCHLORIDE 4 MG: 2 SOLUTION INTRAMUSCULAR; INTRAVENOUS at 07:06

## 2021-06-29 ENCOUNTER — HOSPITAL ENCOUNTER (OUTPATIENT)
Facility: HOSPITAL | Age: 46
Discharge: HOME | End: 2021-06-30
Attending: INTERNAL MEDICINE | Admitting: INTERNAL MEDICINE
Payer: COMMERCIAL

## 2021-06-29 DIAGNOSIS — I63.9 CVA (CEREBRAL VASCULAR ACCIDENT): ICD-10-CM

## 2021-06-29 DIAGNOSIS — R42 DIZZINESS: Primary | ICD-10-CM

## 2021-06-29 PROBLEM — D45 POLYCYTHEMIA VERA: Status: ACTIVE | Noted: 2021-06-29

## 2021-06-29 LAB
AORTIC ROOT ANNULUS: 2.57 CM
AORTIC VALVE CUSP SEPERATION: 1.82 CM
APTT BLDCRRT: 25.6 SEC (ref 21–32)
AV INDEX (PROSTH): 0.85
AV MEAN GRADIENT: 3 MMHG
AV PEAK GRADIENT: 5 MMHG
AV VALVE AREA: 2.67 CM2
AV VELOCITY RATIO: 0.93
BSA FOR ECHO PROCEDURE: 1.76 M2
CHOLEST SERPL-MCNC: 172 MG/DL (ref 120–199)
CHOLEST/HDLC SERPL: 3.7 {RATIO} (ref 2–5)
CK MB SERPL-MCNC: 0.5 NG/ML (ref 0.1–6.5)
CK MB SERPL-RTO: 1.6 % (ref 0–5)
CK SERPL-CCNC: 31 U/L (ref 20–180)
CV ECHO LV RWT: 0.32 CM
DOP CALC AO PEAK VEL: 1.07 M/S
DOP CALC AO VTI: 20.69 CM
DOP CALC LVOT AREA: 3.1 CM2
DOP CALC LVOT DIAMETER: 2 CM
DOP CALC LVOT PEAK VEL: 1 M/S
DOP CALC LVOT STROKE VOLUME: 55.2 CM3
DOP CALCLVOT PEAK VEL VTI: 17.58 CM
E WAVE DECELERATION TIME: 195.49 MSEC
E/A RATIO: 1.41
E/E' RATIO: 5.85 M/S
ECHO LV POSTERIOR WALL: 0.78 CM (ref 0.6–1.1)
EJECTION FRACTION: 65 %
ESTIMATED AVG GLUCOSE: 82 MG/DL (ref 68–131)
FRACTIONAL SHORTENING: 37 % (ref 28–44)
HBA1C MFR BLD: 4.5 % (ref 4–5.6)
HDLC SERPL-MCNC: 47 MG/DL (ref 40–75)
HDLC SERPL: 27.3 % (ref 20–50)
INR PPP: 1 (ref 0.8–1.2)
INTERVENTRICULAR SEPTUM: 0.75 CM (ref 0.6–1.1)
IVRT: 79.92 MSEC
LA MAJOR: 4.47 CM
LA MINOR: 3.83 CM
LA WIDTH: 2.68 CM
LDLC SERPL CALC-MCNC: 108 MG/DL (ref 63–159)
LEFT ATRIUM SIZE: 2.83 CM
LEFT ATRIUM VOLUME INDEX MOD: 17.2 ML/M2
LEFT ATRIUM VOLUME INDEX: 15.3 ML/M2
LEFT ATRIUM VOLUME MOD: 29.99 CM3
LEFT ATRIUM VOLUME: 26.59 CM3
LEFT INTERNAL DIMENSION IN SYSTOLE: 3.05 CM (ref 2.1–4)
LEFT VENTRICLE DIASTOLIC VOLUME INDEX: 62.61 ML/M2
LEFT VENTRICLE DIASTOLIC VOLUME: 108.95 ML
LEFT VENTRICLE MASS INDEX: 69 G/M2
LEFT VENTRICLE SYSTOLIC VOLUME INDEX: 20.9 ML/M2
LEFT VENTRICLE SYSTOLIC VOLUME: 36.31 ML
LEFT VENTRICULAR INTERNAL DIMENSION IN DIASTOLE: 4.83 CM (ref 3.5–6)
LEFT VENTRICULAR MASS: 120.89 G
LV LATERAL E/E' RATIO: 5.43 M/S
LV SEPTAL E/E' RATIO: 6.33 M/S
MV A" WAVE DURATION": 9.51 MSEC
MV MEAN GRADIENT: 0 MMHG
MV PEAK A VEL: 0.54 M/S
MV PEAK E VEL: 0.76 M/S
MV PEAK GRADIENT: 3 MMHG
MV STENOSIS PRESSURE HALF TIME: 56.69 MS
MV VALVE AREA P 1/2 METHOD: 3.88 CM2
NONHDLC SERPL-MCNC: 125 MG/DL
PISA MRMAX VEL: 0.05 M/S
PISA TR MAX VEL: 3.05 M/S
PROTHROMBIN TIME: 10.8 SEC (ref 9–12.5)
PULM VEIN S/D RATIO: 1.13
PV PEAK D VEL: 0.61 M/S
PV PEAK S VEL: 0.69 M/S
PV PEAK VELOCITY: 0.68 CM/S
RA MAJOR: 4.37 CM
RA PRESSURE: 3 MMHG
RA WIDTH: 2.33 CM
RIGHT VENTRICULAR END-DIASTOLIC DIMENSION: 2.79 CM
RV TISSUE DOPPLER FREE WALL SYSTOLIC VELOCITY 1 (APICAL 4 CHAMBER VIEW): 13.4 CM/S
TDI LATERAL: 0.14 M/S
TDI SEPTAL: 0.12 M/S
TDI: 0.13 M/S
TR MAX PG: 37 MMHG
TRICUSPID ANNULAR PLANE SYSTOLIC EXCURSION: 2.5 CM
TRIGL SERPL-MCNC: 85 MG/DL (ref 30–150)
TROPONIN I SERPL DL<=0.01 NG/ML-MCNC: 0.01 NG/ML (ref 0–0.03)
TV REST PULMONARY ARTERY PRESSURE: 40 MMHG

## 2021-06-29 PROCEDURE — 97161 PT EVAL LOW COMPLEX 20 MIN: CPT

## 2021-06-29 PROCEDURE — 85730 THROMBOPLASTIN TIME PARTIAL: CPT | Performed by: NURSE PRACTITIONER

## 2021-06-29 PROCEDURE — 82550 ASSAY OF CK (CPK): CPT | Performed by: NURSE PRACTITIONER

## 2021-06-29 PROCEDURE — 36415 COLL VENOUS BLD VENIPUNCTURE: CPT | Performed by: NURSE PRACTITIONER

## 2021-06-29 PROCEDURE — 80061 LIPID PANEL: CPT | Performed by: NURSE PRACTITIONER

## 2021-06-29 PROCEDURE — 94761 N-INVAS EAR/PLS OXIMETRY MLT: CPT

## 2021-06-29 PROCEDURE — 92610 EVALUATE SWALLOWING FUNCTION: CPT

## 2021-06-29 PROCEDURE — G0378 HOSPITAL OBSERVATION PER HR: HCPCS

## 2021-06-29 PROCEDURE — 83036 HEMOGLOBIN GLYCOSYLATED A1C: CPT | Performed by: NURSE PRACTITIONER

## 2021-06-29 PROCEDURE — 84484 ASSAY OF TROPONIN QUANT: CPT | Performed by: NURSE PRACTITIONER

## 2021-06-29 PROCEDURE — 85610 PROTHROMBIN TIME: CPT | Performed by: NURSE PRACTITIONER

## 2021-06-29 PROCEDURE — 25000003 PHARM REV CODE 250: Performed by: NURSE PRACTITIONER

## 2021-06-29 PROCEDURE — G0379 DIRECT REFER HOSPITAL OBSERV: HCPCS

## 2021-06-29 PROCEDURE — 92523 SPEECH SOUND LANG COMPREHEN: CPT

## 2021-06-29 PROCEDURE — 97165 OT EVAL LOW COMPLEX 30 MIN: CPT

## 2021-06-29 RX ORDER — LEVOTHYROXINE SODIUM 125 UG/1
125 TABLET ORAL
Status: DISCONTINUED | OUTPATIENT
Start: 2021-06-29 | End: 2021-06-30 | Stop reason: HOSPADM

## 2021-06-29 RX ORDER — FAMOTIDINE 20 MG/1
20 TABLET, FILM COATED ORAL 2 TIMES DAILY
Status: DISCONTINUED | OUTPATIENT
Start: 2021-06-29 | End: 2021-06-30 | Stop reason: HOSPADM

## 2021-06-29 RX ORDER — MECLIZINE HYDROCHLORIDE 25 MG/1
25 TABLET ORAL 3 TIMES DAILY
Status: DISCONTINUED | OUTPATIENT
Start: 2021-06-29 | End: 2021-06-30 | Stop reason: HOSPADM

## 2021-06-29 RX ORDER — SODIUM CHLORIDE 0.9 % (FLUSH) 0.9 %
10 SYRINGE (ML) INJECTION
Status: DISCONTINUED | OUTPATIENT
Start: 2021-06-29 | End: 2021-06-30 | Stop reason: HOSPADM

## 2021-06-29 RX ORDER — ONDANSETRON 4 MG/1
8 TABLET, ORALLY DISINTEGRATING ORAL EVERY 8 HOURS PRN
Status: DISCONTINUED | OUTPATIENT
Start: 2021-06-29 | End: 2021-06-30 | Stop reason: HOSPADM

## 2021-06-29 RX ORDER — TRAZODONE HYDROCHLORIDE 50 MG/1
50 TABLET ORAL NIGHTLY
Status: DISCONTINUED | OUTPATIENT
Start: 2021-06-29 | End: 2021-06-30 | Stop reason: HOSPADM

## 2021-06-29 RX ORDER — ACETAMINOPHEN 325 MG/1
650 TABLET ORAL EVERY 6 HOURS PRN
Status: DISCONTINUED | OUTPATIENT
Start: 2021-06-29 | End: 2021-06-30 | Stop reason: HOSPADM

## 2021-06-29 RX ORDER — METOPROLOL SUCCINATE 50 MG/1
50 TABLET, EXTENDED RELEASE ORAL DAILY
Status: DISCONTINUED | OUTPATIENT
Start: 2021-06-29 | End: 2021-06-30 | Stop reason: HOSPADM

## 2021-06-29 RX ORDER — ATORVASTATIN CALCIUM 40 MG/1
40 TABLET, FILM COATED ORAL DAILY
Status: DISCONTINUED | OUTPATIENT
Start: 2021-06-29 | End: 2021-06-30 | Stop reason: HOSPADM

## 2021-06-29 RX ORDER — ASPIRIN 325 MG
325 TABLET, DELAYED RELEASE (ENTERIC COATED) ORAL DAILY
Status: DISCONTINUED | OUTPATIENT
Start: 2021-06-29 | End: 2021-06-30 | Stop reason: HOSPADM

## 2021-06-29 RX ORDER — TOPIRAMATE 25 MG/1
50 TABLET ORAL 2 TIMES DAILY
Status: DISCONTINUED | OUTPATIENT
Start: 2021-06-29 | End: 2021-06-30 | Stop reason: HOSPADM

## 2021-06-29 RX ADMIN — ASPIRIN 325 MG: 325 TABLET, COATED ORAL at 09:06

## 2021-06-29 RX ADMIN — TRAZODONE HYDROCHLORIDE 50 MG: 50 TABLET ORAL at 09:06

## 2021-06-29 RX ADMIN — MECLIZINE HYDROCHLORIDE 25 MG: 25 TABLET ORAL at 01:06

## 2021-06-29 RX ADMIN — TOPIRAMATE 50 MG: 25 TABLET, FILM COATED ORAL at 09:06

## 2021-06-29 RX ADMIN — FAMOTIDINE 20 MG: 20 TABLET, FILM COATED ORAL at 09:06

## 2021-06-29 RX ADMIN — TOPIRAMATE 50 MG: 25 TABLET, FILM COATED ORAL at 01:06

## 2021-06-29 RX ADMIN — ATORVASTATIN CALCIUM 40 MG: 40 TABLET, FILM COATED ORAL at 09:06

## 2021-06-29 RX ADMIN — MECLIZINE HYDROCHLORIDE 25 MG: 25 TABLET ORAL at 09:06

## 2021-06-29 RX ADMIN — TRAZODONE HYDROCHLORIDE 50 MG: 50 TABLET ORAL at 01:06

## 2021-06-29 RX ADMIN — LEVOTHYROXINE SODIUM 125 MCG: 0.12 TABLET ORAL at 06:06

## 2021-06-29 RX ADMIN — MECLIZINE HYDROCHLORIDE 25 MG: 25 TABLET ORAL at 03:06

## 2021-06-30 VITALS
OXYGEN SATURATION: 98 % | HEART RATE: 88 BPM | WEIGHT: 152 LBS | HEIGHT: 64 IN | DIASTOLIC BLOOD PRESSURE: 66 MMHG | RESPIRATION RATE: 18 BRPM | SYSTOLIC BLOOD PRESSURE: 114 MMHG | TEMPERATURE: 98 F | BODY MASS INDEX: 25.95 KG/M2

## 2021-06-30 PROBLEM — I63.9 CVA (CEREBRAL VASCULAR ACCIDENT): Status: RESOLVED | Noted: 2021-06-29 | Resolved: 2021-06-30

## 2021-06-30 LAB
25(OH)D3+25(OH)D2 SERPL-MCNC: 59 NG/ML (ref 30–96)
ALBUMIN SERPL BCP-MCNC: 3.4 G/DL (ref 3.5–5.2)
ALP SERPL-CCNC: 48 U/L (ref 55–135)
ALT SERPL W/O P-5'-P-CCNC: 15 U/L (ref 10–44)
ANION GAP SERPL CALC-SCNC: 4 MMOL/L (ref 8–16)
AST SERPL-CCNC: 10 U/L (ref 10–40)
BASOPHILS # BLD AUTO: 0.03 K/UL (ref 0–0.2)
BASOPHILS NFR BLD: 0.5 % (ref 0–1.9)
BILIRUB SERPL-MCNC: 0.3 MG/DL (ref 0.1–1)
BUN SERPL-MCNC: 19 MG/DL (ref 6–20)
CALCIUM SERPL-MCNC: 8.5 MG/DL (ref 8.7–10.5)
CHLORIDE SERPL-SCNC: 116 MMOL/L (ref 95–110)
CO2 SERPL-SCNC: 22 MMOL/L (ref 23–29)
CREAT SERPL-MCNC: 0.7 MG/DL (ref 0.5–1.4)
DIFFERENTIAL METHOD: ABNORMAL
EOSINOPHIL # BLD AUTO: 0.1 K/UL (ref 0–0.5)
EOSINOPHIL NFR BLD: 2 % (ref 0–8)
ERYTHROCYTE [DISTWIDTH] IN BLOOD BY AUTOMATED COUNT: 13.2 % (ref 11.5–14.5)
EST. GFR  (AFRICAN AMERICAN): >60 ML/MIN/1.73 M^2
EST. GFR  (NON AFRICAN AMERICAN): >60 ML/MIN/1.73 M^2
GLUCOSE SERPL-MCNC: 86 MG/DL (ref 70–110)
HCT VFR BLD AUTO: 44.6 % (ref 37–48.5)
HGB BLD-MCNC: 14.7 G/DL (ref 12–16)
IMM GRANULOCYTES # BLD AUTO: 0.02 K/UL (ref 0–0.04)
IMM GRANULOCYTES NFR BLD AUTO: 0.3 % (ref 0–0.5)
LYMPHOCYTES # BLD AUTO: 2.3 K/UL (ref 1–4.8)
LYMPHOCYTES NFR BLD: 35 % (ref 18–48)
MAGNESIUM SERPL-MCNC: 1.8 MG/DL (ref 1.6–2.6)
MCH RBC QN AUTO: 31.4 PG (ref 27–31)
MCHC RBC AUTO-ENTMCNC: 33 G/DL (ref 32–36)
MCV RBC AUTO: 95 FL (ref 82–98)
MONOCYTES # BLD AUTO: 0.6 K/UL (ref 0.3–1)
MONOCYTES NFR BLD: 10 % (ref 4–15)
NEUTROPHILS # BLD AUTO: 3.4 K/UL (ref 1.8–7.7)
NEUTROPHILS NFR BLD: 52.2 % (ref 38–73)
NRBC BLD-RTO: 0 /100 WBC
PHOSPHATE SERPL-MCNC: 3.7 MG/DL (ref 2.7–4.5)
PLATELET # BLD AUTO: 136 K/UL (ref 150–450)
PMV BLD AUTO: 9.9 FL (ref 9.2–12.9)
POTASSIUM SERPL-SCNC: 4.1 MMOL/L (ref 3.5–5.1)
PROT SERPL-MCNC: 5.4 G/DL (ref 6–8.4)
RBC # BLD AUTO: 4.68 M/UL (ref 4–5.4)
SODIUM SERPL-SCNC: 142 MMOL/L (ref 136–145)
VIT B12 SERPL-MCNC: 259 PG/ML (ref 210–950)
WBC # BLD AUTO: 6.42 K/UL (ref 3.9–12.7)

## 2021-06-30 PROCEDURE — 83735 ASSAY OF MAGNESIUM: CPT | Performed by: NURSE PRACTITIONER

## 2021-06-30 PROCEDURE — G0378 HOSPITAL OBSERVATION PER HR: HCPCS

## 2021-06-30 PROCEDURE — 84100 ASSAY OF PHOSPHORUS: CPT | Performed by: NURSE PRACTITIONER

## 2021-06-30 PROCEDURE — 94761 N-INVAS EAR/PLS OXIMETRY MLT: CPT

## 2021-06-30 PROCEDURE — 80201 ASSAY OF TOPIRAMATE: CPT | Performed by: NURSE PRACTITIONER

## 2021-06-30 PROCEDURE — 82306 VITAMIN D 25 HYDROXY: CPT | Performed by: NURSE PRACTITIONER

## 2021-06-30 PROCEDURE — 82607 VITAMIN B-12: CPT | Performed by: NURSE PRACTITIONER

## 2021-06-30 PROCEDURE — 25000003 PHARM REV CODE 250: Performed by: NURSE PRACTITIONER

## 2021-06-30 PROCEDURE — 80053 COMPREHEN METABOLIC PANEL: CPT | Performed by: NURSE PRACTITIONER

## 2021-06-30 PROCEDURE — 97110 THERAPEUTIC EXERCISES: CPT

## 2021-06-30 PROCEDURE — 85025 COMPLETE CBC W/AUTO DIFF WBC: CPT | Performed by: NURSE PRACTITIONER

## 2021-06-30 RX ORDER — MECLIZINE HYDROCHLORIDE 25 MG/1
25 TABLET ORAL 3 TIMES DAILY
Qty: 90 TABLET | Refills: 0 | Status: SHIPPED | OUTPATIENT
Start: 2021-06-30 | End: 2022-07-14

## 2021-06-30 RX ADMIN — ACETAMINOPHEN 650 MG: 325 TABLET ORAL at 08:06

## 2021-06-30 RX ADMIN — MECLIZINE HYDROCHLORIDE 25 MG: 25 TABLET ORAL at 08:06

## 2021-06-30 RX ADMIN — FAMOTIDINE 20 MG: 20 TABLET, FILM COATED ORAL at 08:06

## 2021-06-30 RX ADMIN — LEVOTHYROXINE SODIUM 125 MCG: 0.12 TABLET ORAL at 06:06

## 2021-06-30 RX ADMIN — ASPIRIN 325 MG: 325 TABLET, COATED ORAL at 08:06

## 2021-06-30 RX ADMIN — ATORVASTATIN CALCIUM 40 MG: 40 TABLET, FILM COATED ORAL at 08:06

## 2021-06-30 RX ADMIN — TOPIRAMATE 50 MG: 25 TABLET, FILM COATED ORAL at 08:06

## 2021-07-02 LAB — TOPIRAMATE SERPL-MCNC: 2.6 MCG/ML

## 2021-08-04 ENCOUNTER — OFFICE VISIT (OUTPATIENT)
Dept: OPTOMETRY | Facility: CLINIC | Age: 46
End: 2021-08-04
Payer: COMMERCIAL

## 2021-08-04 DIAGNOSIS — H53.8 BLURRY VISION, BILATERAL: Primary | ICD-10-CM

## 2021-08-04 DIAGNOSIS — H52.7 REFRACTIVE ERROR: ICD-10-CM

## 2021-08-04 DIAGNOSIS — R42 DIZZINESS: ICD-10-CM

## 2021-08-04 PROCEDURE — 92015 PR REFRACTION: ICD-10-PCS | Mod: S$GLB,,, | Performed by: OPTOMETRIST

## 2021-08-04 PROCEDURE — 99999 PR PBB SHADOW E&M-EST. PATIENT-LVL III: CPT | Mod: PBBFAC,,, | Performed by: OPTOMETRIST

## 2021-08-04 PROCEDURE — 1126F AMNT PAIN NOTED NONE PRSNT: CPT | Mod: CPTII,S$GLB,, | Performed by: OPTOMETRIST

## 2021-08-04 PROCEDURE — 92015 DETERMINE REFRACTIVE STATE: CPT | Mod: S$GLB,,, | Performed by: OPTOMETRIST

## 2021-08-04 PROCEDURE — 1126F PR PAIN SEVERITY QUANTIFIED, NO PAIN PRESENT: ICD-10-PCS | Mod: CPTII,S$GLB,, | Performed by: OPTOMETRIST

## 2021-08-04 PROCEDURE — 3044F HG A1C LEVEL LT 7.0%: CPT | Mod: CPTII,S$GLB,, | Performed by: OPTOMETRIST

## 2021-08-04 PROCEDURE — 92004 PR EYE EXAM, NEW PATIENT,COMPREHESV: ICD-10-PCS | Mod: S$GLB,,, | Performed by: OPTOMETRIST

## 2021-08-04 PROCEDURE — 92004 COMPRE OPH EXAM NEW PT 1/>: CPT | Mod: S$GLB,,, | Performed by: OPTOMETRIST

## 2021-08-04 PROCEDURE — 99999 PR PBB SHADOW E&M-EST. PATIENT-LVL III: ICD-10-PCS | Mod: PBBFAC,,, | Performed by: OPTOMETRIST

## 2021-08-04 PROCEDURE — 1159F MED LIST DOCD IN RCRD: CPT | Mod: CPTII,S$GLB,, | Performed by: OPTOMETRIST

## 2021-08-04 PROCEDURE — 1160F RVW MEDS BY RX/DR IN RCRD: CPT | Mod: CPTII,S$GLB,, | Performed by: OPTOMETRIST

## 2021-08-04 PROCEDURE — 1159F PR MEDICATION LIST DOCUMENTED IN MEDICAL RECORD: ICD-10-PCS | Mod: CPTII,S$GLB,, | Performed by: OPTOMETRIST

## 2021-08-04 PROCEDURE — 3044F PR MOST RECENT HEMOGLOBIN A1C LEVEL <7.0%: ICD-10-PCS | Mod: CPTII,S$GLB,, | Performed by: OPTOMETRIST

## 2021-08-04 PROCEDURE — 1160F PR REVIEW ALL MEDS BY PRESCRIBER/CLIN PHARMACIST DOCUMENTED: ICD-10-PCS | Mod: CPTII,S$GLB,, | Performed by: OPTOMETRIST

## 2021-08-04 RX ORDER — ASPIRIN 81 MG/1
81 TABLET ORAL
COMMUNITY
Start: 2021-07-07

## 2021-08-12 ENCOUNTER — HOSPITAL ENCOUNTER (OUTPATIENT)
Dept: PULMONOLOGY | Facility: HOSPITAL | Age: 46
Discharge: HOME | End: 2021-08-12
Attending: INTERNAL MEDICINE
Payer: COMMERCIAL

## 2021-08-12 ENCOUNTER — TELEPHONE (OUTPATIENT)
Dept: PULMONOLOGY | Facility: CLINIC | Age: 46
End: 2021-08-12

## 2021-08-12 ENCOUNTER — OFFICE VISIT (OUTPATIENT)
Dept: PULMONOLOGY | Facility: CLINIC | Age: 46
End: 2021-08-12
Payer: COMMERCIAL

## 2021-08-12 ENCOUNTER — HOSPITAL ENCOUNTER (OUTPATIENT)
Dept: RADIOLOGY | Facility: HOSPITAL | Age: 46
Discharge: HOME | End: 2021-08-12
Attending: INTERNAL MEDICINE
Payer: COMMERCIAL

## 2021-08-12 VITALS
DIASTOLIC BLOOD PRESSURE: 75 MMHG | BODY MASS INDEX: 30.11 KG/M2 | WEIGHT: 176.38 LBS | SYSTOLIC BLOOD PRESSURE: 123 MMHG | HEIGHT: 64 IN | OXYGEN SATURATION: 100 % | HEART RATE: 67 BPM

## 2021-08-12 DIAGNOSIS — R00.0 TACHYCARDIA: ICD-10-CM

## 2021-08-12 DIAGNOSIS — I27.20 PULMONARY HYPERTENSION: Primary | ICD-10-CM

## 2021-08-12 DIAGNOSIS — D75.1 POLYCYTHEMIA: ICD-10-CM

## 2021-08-12 DIAGNOSIS — M19.049 ARTHRITIS OF HAND: ICD-10-CM

## 2021-08-12 DIAGNOSIS — R68.2 DRY MOUTH: ICD-10-CM

## 2021-08-12 DIAGNOSIS — I27.20 PULMONARY HYPERTENSION: ICD-10-CM

## 2021-08-12 LAB
BR6MWT: NORMAL
BRPFT: NORMAL
DLCO ADJ PRE: 18.24 ML/(MIN*MMHG)
DLCO SINGLE BREATH LLN: 19.61
DLCO SINGLE BREATH PRE REF: 72 %
DLCO SINGLE BREATH REF: 25.34
DLCOC SBVA LLN: 3.56
DLCOC SBVA PRE REF: 77.6 %
DLCOC SBVA REF: 5.1
DLCOC SINGLE BREATH LLN: 19.61
DLCOC SINGLE BREATH PRE REF: 72 %
DLCOC SINGLE BREATH REF: 25.34
DLCOVA LLN: 3.56
DLCOVA PRE REF: 77.6 %
DLCOVA PRE: 3.96 ML/(MIN*MMHG*L)
DLCOVA REF: 5.1
DLVAADJ PRE: 3.96 ML/(MIN*MMHG*L)
ERVN2 LLN: -16448.94
ERVN2 PRE REF: 38.4 %
ERVN2 PRE: 0.41 L
ERVN2 REF: 1.06
FEF 25 75 CHG: 52.1 %
FEF 25 75 LLN: 1.8
FEF 25 75 POST REF: 87.1 %
FEF 25 75 PRE REF: 57.2 %
FEF 25 75 REF: 3.03
FET100 CHG: -25.3 %
FEV1 CHG: 5.7 %
FEV1 FVC CHG: 10 %
FEV1 FVC LLN: 71
FEV1 FVC POST REF: 97.2 %
FEV1 FVC PRE REF: 88.4 %
FEV1 FVC REF: 82
FEV1 LLN: 2.33
FEV1 POST REF: 91.5 %
FEV1 PRE REF: 86.6 %
FEV1 REF: 2.94
FRCN2 LLN: 1.87
FRCN2 PRE REF: 79.4 %
FRCN2 REF: 2.69
FVC CHG: -3.8 %
FVC LLN: 2.88
FVC POST REF: 93.7 %
FVC PRE REF: 97.5 %
FVC REF: 3.63
IVC PRE: 3.12 L
IVC SINGLE BREATH LLN: 2.88
IVC SINGLE BREATH PRE REF: 86 %
IVC SINGLE BREATH REF: 3.63
MVV LLN: 94
MVV PRE REF: 45.2 %
MVV REF: 111
PEF CHG: 21.9 %
PEF LLN: 5.23
PEF POST REF: 96.9 %
PEF PRE REF: 79.4 %
PEF REF: 6.95
POST FEF 25 75: 2.64 L/S
POST FET 100: 8.5 SEC
POST FEV1 FVC: 79.21 %
POST FEV1: 2.69 L
POST FVC: 3.4 L
POST PEF: 6.73 L/S
PRE DLCO: 18.24 ML/(MIN*MMHG)
PRE FEF 25 75: 1.74 L/S
PRE FET 100: 11.38 SEC
PRE FEV1 FVC: 72.03 %
PRE FEV1: 2.55 L
PRE FRC N2: 2.14 L
PRE FVC: 3.54 L
PRE MVV: 50 L/MIN
PRE PEF: 5.52 L/S
RVN2 LLN: 1.06
RVN2 PRE REF: 35.8 %
RVN2 PRE: 0.59 L
RVN2 REF: 1.64
RVN2TLCN2 LLN: 23.99
RVN2TLCN2 PRE REF: 42.4 %
RVN2TLCN2 PRE: 14.24 %
RVN2TLCN2 REF: 33.58
TLCN2 LLN: 3.98
TLCN2 PRE REF: 83 %
TLCN2 PRE: 4.12 L
TLCN2 REF: 4.97
VA PRE: 4.61 L
VA SINGLE BREATH LLN: 4.82
VA SINGLE BREATH PRE REF: 95.7 %
VA SINGLE BREATH REF: 4.82
VCMAXN2 LLN: 2.88
VCMAXN2 PRE REF: 97.5 %
VCMAXN2 PRE: 3.54 L
VCMAXN2 REF: 3.63

## 2021-08-12 PROCEDURE — 94060 EVALUATION OF WHEEZING: CPT

## 2021-08-12 PROCEDURE — 71046 XR CHEST PA AND LATERAL: ICD-10-PCS | Mod: 26,,, | Performed by: RADIOLOGY

## 2021-08-12 PROCEDURE — 3008F PR BODY MASS INDEX (BMI) DOCUMENTED: ICD-10-PCS | Mod: CPTII,S$GLB,, | Performed by: INTERNAL MEDICINE

## 2021-08-12 PROCEDURE — 1159F MED LIST DOCD IN RCRD: CPT | Mod: CPTII,S$GLB,, | Performed by: INTERNAL MEDICINE

## 2021-08-12 PROCEDURE — 3044F HG A1C LEVEL LT 7.0%: CPT | Mod: CPTII,S$GLB,, | Performed by: INTERNAL MEDICINE

## 2021-08-12 PROCEDURE — 3074F SYST BP LT 130 MM HG: CPT | Mod: CPTII,S$GLB,, | Performed by: INTERNAL MEDICINE

## 2021-08-12 PROCEDURE — 3044F PR MOST RECENT HEMOGLOBIN A1C LEVEL <7.0%: ICD-10-PCS | Mod: CPTII,S$GLB,, | Performed by: INTERNAL MEDICINE

## 2021-08-12 PROCEDURE — 99999 PR PBB SHADOW E&M-EST. PATIENT-LVL IV: CPT | Mod: PBBFAC,,, | Performed by: INTERNAL MEDICINE

## 2021-08-12 PROCEDURE — 3008F BODY MASS INDEX DOCD: CPT | Mod: CPTII,S$GLB,, | Performed by: INTERNAL MEDICINE

## 2021-08-12 PROCEDURE — 94799 COMPL PULMO FUNCTION W/BR: ICD-10-PCS | Mod: 26,,, | Performed by: INTERNAL MEDICINE

## 2021-08-12 PROCEDURE — 1126F AMNT PAIN NOTED NONE PRSNT: CPT | Mod: CPTII,S$GLB,, | Performed by: INTERNAL MEDICINE

## 2021-08-12 PROCEDURE — 99205 PR OFFICE/OUTPT VISIT, NEW, LEVL V, 60-74 MIN: ICD-10-PCS | Mod: S$GLB,,, | Performed by: INTERNAL MEDICINE

## 2021-08-12 PROCEDURE — 94618 PULMONARY STRESS TESTING: CPT

## 2021-08-12 PROCEDURE — 99999 PR PBB SHADOW E&M-EST. PATIENT-LVL IV: ICD-10-PCS | Mod: PBBFAC,,, | Performed by: INTERNAL MEDICINE

## 2021-08-12 PROCEDURE — 94729 DIFFUSING CAPACITY: CPT

## 2021-08-12 PROCEDURE — 1159F PR MEDICATION LIST DOCUMENTED IN MEDICAL RECORD: ICD-10-PCS | Mod: CPTII,S$GLB,, | Performed by: INTERNAL MEDICINE

## 2021-08-12 PROCEDURE — 94799 UNLISTED PULMONARY SVC/PX: CPT | Mod: 26,,, | Performed by: INTERNAL MEDICINE

## 2021-08-12 PROCEDURE — 71046 X-RAY EXAM CHEST 2 VIEWS: CPT | Mod: 26,,, | Performed by: RADIOLOGY

## 2021-08-12 PROCEDURE — 94727 GAS DIL/WSHOT DETER LNG VOL: CPT

## 2021-08-12 PROCEDURE — 3078F PR MOST RECENT DIASTOLIC BLOOD PRESSURE < 80 MM HG: ICD-10-PCS | Mod: CPTII,S$GLB,, | Performed by: INTERNAL MEDICINE

## 2021-08-12 PROCEDURE — 99205 OFFICE O/P NEW HI 60 MIN: CPT | Mod: S$GLB,,, | Performed by: INTERNAL MEDICINE

## 2021-08-12 PROCEDURE — 3074F PR MOST RECENT SYSTOLIC BLOOD PRESSURE < 130 MM HG: ICD-10-PCS | Mod: CPTII,S$GLB,, | Performed by: INTERNAL MEDICINE

## 2021-08-12 PROCEDURE — 3078F DIAST BP <80 MM HG: CPT | Mod: CPTII,S$GLB,, | Performed by: INTERNAL MEDICINE

## 2021-08-12 PROCEDURE — 1126F PR PAIN SEVERITY QUANTIFIED, NO PAIN PRESENT: ICD-10-PCS | Mod: CPTII,S$GLB,, | Performed by: INTERNAL MEDICINE

## 2021-08-12 PROCEDURE — 71046 X-RAY EXAM CHEST 2 VIEWS: CPT | Mod: TC,FY

## 2021-08-12 PROCEDURE — 94799 PR NIF/PIF PULMONARY FUNCTION TEST: ICD-10-PCS | Mod: 26,,, | Performed by: INTERNAL MEDICINE

## 2021-08-12 RX ORDER — MULTIVITAMIN
1 TABLET ORAL DAILY
COMMUNITY

## 2021-08-17 ENCOUNTER — PATIENT MESSAGE (OUTPATIENT)
Dept: PULMONOLOGY | Facility: CLINIC | Age: 46
End: 2021-08-17

## 2021-08-23 ENCOUNTER — PATIENT MESSAGE (OUTPATIENT)
Dept: PULMONOLOGY | Facility: CLINIC | Age: 46
End: 2021-08-23

## 2021-08-27 ENCOUNTER — OFFICE VISIT (OUTPATIENT)
Dept: HEMATOLOGY/ONCOLOGY | Facility: CLINIC | Age: 46
End: 2021-08-27
Payer: COMMERCIAL

## 2021-08-27 VITALS
TEMPERATURE: 98 F | DIASTOLIC BLOOD PRESSURE: 85 MMHG | BODY MASS INDEX: 30.17 KG/M2 | HEART RATE: 83 BPM | HEIGHT: 64 IN | SYSTOLIC BLOOD PRESSURE: 125 MMHG | RESPIRATION RATE: 18 BRPM | WEIGHT: 176.69 LBS

## 2021-08-27 DIAGNOSIS — D50.9 IRON DEFICIENCY ANEMIA, UNSPECIFIED IRON DEFICIENCY ANEMIA TYPE: ICD-10-CM

## 2021-08-27 DIAGNOSIS — D51.0 PERNICIOUS ANEMIA: Primary | ICD-10-CM

## 2021-08-27 PROCEDURE — 3079F DIAST BP 80-89 MM HG: CPT | Mod: S$GLB,,, | Performed by: INTERNAL MEDICINE

## 2021-08-27 PROCEDURE — 3044F HG A1C LEVEL LT 7.0%: CPT | Mod: S$GLB,,, | Performed by: INTERNAL MEDICINE

## 2021-08-27 PROCEDURE — 3074F SYST BP LT 130 MM HG: CPT | Mod: S$GLB,,, | Performed by: INTERNAL MEDICINE

## 2021-08-27 PROCEDURE — 3008F BODY MASS INDEX DOCD: CPT | Mod: S$GLB,,, | Performed by: INTERNAL MEDICINE

## 2021-08-27 PROCEDURE — 99204 OFFICE O/P NEW MOD 45 MIN: CPT | Mod: S$GLB,,, | Performed by: INTERNAL MEDICINE

## 2021-08-27 PROCEDURE — 3074F PR MOST RECENT SYSTOLIC BLOOD PRESSURE < 130 MM HG: ICD-10-PCS | Mod: S$GLB,,, | Performed by: INTERNAL MEDICINE

## 2021-08-27 PROCEDURE — 3008F PR BODY MASS INDEX (BMI) DOCUMENTED: ICD-10-PCS | Mod: S$GLB,,, | Performed by: INTERNAL MEDICINE

## 2021-08-27 PROCEDURE — 99204 PR OFFICE/OUTPT VISIT, NEW, LEVL IV, 45-59 MIN: ICD-10-PCS | Mod: S$GLB,,, | Performed by: INTERNAL MEDICINE

## 2021-08-27 PROCEDURE — 3079F PR MOST RECENT DIASTOLIC BLOOD PRESSURE 80-89 MM HG: ICD-10-PCS | Mod: S$GLB,,, | Performed by: INTERNAL MEDICINE

## 2021-08-27 PROCEDURE — 3044F PR MOST RECENT HEMOGLOBIN A1C LEVEL <7.0%: ICD-10-PCS | Mod: S$GLB,,, | Performed by: INTERNAL MEDICINE

## 2021-08-27 RX ORDER — CYANOCOBALAMIN 1000 UG/ML
1000 INJECTION, SOLUTION INTRAMUSCULAR; SUBCUTANEOUS
Qty: 3 ML | Refills: 4 | Status: SHIPPED | OUTPATIENT
Start: 2021-08-27 | End: 2021-10-23

## 2021-08-27 RX ORDER — SYRINGE, DISPOSABLE, 1 ML
1 SYRINGE, EMPTY DISPOSABLE MISCELLANEOUS
Qty: 3 SYRINGE | Refills: 4 | Status: SHIPPED | OUTPATIENT
Start: 2021-08-27 | End: 2021-10-23

## 2021-09-30 ENCOUNTER — OFFICE VISIT (OUTPATIENT)
Dept: HEMATOLOGY/ONCOLOGY | Facility: CLINIC | Age: 46
End: 2021-09-30
Payer: COMMERCIAL

## 2021-09-30 VITALS
WEIGHT: 185 LBS | BODY MASS INDEX: 31.58 KG/M2 | HEIGHT: 64 IN | DIASTOLIC BLOOD PRESSURE: 84 MMHG | HEART RATE: 87 BPM | RESPIRATION RATE: 18 BRPM | SYSTOLIC BLOOD PRESSURE: 125 MMHG

## 2021-09-30 DIAGNOSIS — D51.8 ANEMIA OF DECREASED VITAMIN B12 ABSORPTION: Primary | ICD-10-CM

## 2021-09-30 PROCEDURE — 3079F PR MOST RECENT DIASTOLIC BLOOD PRESSURE 80-89 MM HG: ICD-10-PCS | Mod: S$GLB,,, | Performed by: INTERNAL MEDICINE

## 2021-09-30 PROCEDURE — 3044F PR MOST RECENT HEMOGLOBIN A1C LEVEL <7.0%: ICD-10-PCS | Mod: S$GLB,,, | Performed by: INTERNAL MEDICINE

## 2021-09-30 PROCEDURE — 3079F DIAST BP 80-89 MM HG: CPT | Mod: S$GLB,,, | Performed by: INTERNAL MEDICINE

## 2021-09-30 PROCEDURE — 3008F BODY MASS INDEX DOCD: CPT | Mod: S$GLB,,, | Performed by: INTERNAL MEDICINE

## 2021-09-30 PROCEDURE — 3074F PR MOST RECENT SYSTOLIC BLOOD PRESSURE < 130 MM HG: ICD-10-PCS | Mod: S$GLB,,, | Performed by: INTERNAL MEDICINE

## 2021-09-30 PROCEDURE — 99213 OFFICE O/P EST LOW 20 MIN: CPT | Mod: S$GLB,,, | Performed by: INTERNAL MEDICINE

## 2021-09-30 PROCEDURE — 3074F SYST BP LT 130 MM HG: CPT | Mod: S$GLB,,, | Performed by: INTERNAL MEDICINE

## 2021-09-30 PROCEDURE — 99213 PR OFFICE/OUTPT VISIT, EST, LEVL III, 20-29 MIN: ICD-10-PCS | Mod: S$GLB,,, | Performed by: INTERNAL MEDICINE

## 2021-09-30 PROCEDURE — 3008F PR BODY MASS INDEX (BMI) DOCUMENTED: ICD-10-PCS | Mod: S$GLB,,, | Performed by: INTERNAL MEDICINE

## 2021-09-30 PROCEDURE — 3044F HG A1C LEVEL LT 7.0%: CPT | Mod: S$GLB,,, | Performed by: INTERNAL MEDICINE

## 2021-11-15 ENCOUNTER — LAB VISIT (OUTPATIENT)
Dept: LAB | Facility: HOSPITAL | Age: 46
End: 2021-11-15
Attending: INTERNAL MEDICINE
Payer: COMMERCIAL

## 2021-11-15 DIAGNOSIS — D51.8 ANEMIA OF DECREASED VITAMIN B12 ABSORPTION: ICD-10-CM

## 2021-11-15 LAB
BASOPHILS # BLD AUTO: 0.04 K/UL (ref 0–0.2)
BASOPHILS NFR BLD: 0.5 % (ref 0–1.9)
DIFFERENTIAL METHOD: ABNORMAL
EOSINOPHIL # BLD AUTO: 0.1 K/UL (ref 0–0.5)
EOSINOPHIL NFR BLD: 1.1 % (ref 0–8)
ERYTHROCYTE [DISTWIDTH] IN BLOOD BY AUTOMATED COUNT: 12.5 % (ref 11.5–14.5)
HCT VFR BLD AUTO: 42.9 % (ref 37–48.5)
HGB BLD-MCNC: 14.7 G/DL (ref 12–16)
IMM GRANULOCYTES # BLD AUTO: 0.02 K/UL (ref 0–0.04)
IMM GRANULOCYTES NFR BLD AUTO: 0.2 % (ref 0–0.5)
LYMPHOCYTES # BLD AUTO: 1.6 K/UL (ref 1–4.8)
LYMPHOCYTES NFR BLD: 20.3 % (ref 18–48)
MCH RBC QN AUTO: 31.2 PG (ref 27–31)
MCHC RBC AUTO-ENTMCNC: 34.3 G/DL (ref 32–36)
MCV RBC AUTO: 91 FL (ref 82–98)
MONOCYTES # BLD AUTO: 0.6 K/UL (ref 0.3–1)
MONOCYTES NFR BLD: 6.9 % (ref 4–15)
NEUTROPHILS # BLD AUTO: 5.7 K/UL (ref 1.8–7.7)
NEUTROPHILS NFR BLD: 71 % (ref 38–73)
NRBC BLD-RTO: 0 /100 WBC
PLATELET # BLD AUTO: 145 K/UL (ref 150–450)
PMV BLD AUTO: 10.1 FL (ref 9.2–12.9)
RBC # BLD AUTO: 4.71 M/UL (ref 4–5.4)
VIT B12 SERPL-MCNC: 316 PG/ML (ref 210–950)
WBC # BLD AUTO: 8.07 K/UL (ref 3.9–12.7)

## 2021-11-15 PROCEDURE — 82607 VITAMIN B-12: CPT | Performed by: INTERNAL MEDICINE

## 2021-11-15 PROCEDURE — 36415 COLL VENOUS BLD VENIPUNCTURE: CPT | Performed by: INTERNAL MEDICINE

## 2021-11-15 PROCEDURE — 85025 COMPLETE CBC W/AUTO DIFF WBC: CPT | Performed by: INTERNAL MEDICINE

## 2021-12-02 ENCOUNTER — OFFICE VISIT (OUTPATIENT)
Dept: HEMATOLOGY/ONCOLOGY | Facility: CLINIC | Age: 46
End: 2021-12-02
Payer: COMMERCIAL

## 2021-12-02 VITALS
TEMPERATURE: 98 F | DIASTOLIC BLOOD PRESSURE: 87 MMHG | WEIGHT: 199 LBS | HEART RATE: 96 BPM | BODY MASS INDEX: 34.16 KG/M2 | SYSTOLIC BLOOD PRESSURE: 138 MMHG

## 2021-12-02 DIAGNOSIS — D75.1 POLYCYTHEMIA: Primary | ICD-10-CM

## 2021-12-02 DIAGNOSIS — D50.9 IRON DEFICIENCY ANEMIA, UNSPECIFIED IRON DEFICIENCY ANEMIA TYPE: ICD-10-CM

## 2021-12-02 DIAGNOSIS — D51.8 MACROCYTIC ANEMIA WITH VITAMIN B12 DEFICIENCY: ICD-10-CM

## 2021-12-02 PROCEDURE — 99214 OFFICE O/P EST MOD 30 MIN: CPT | Mod: S$GLB,,, | Performed by: INTERNAL MEDICINE

## 2021-12-02 PROCEDURE — 99214 PR OFFICE/OUTPT VISIT, EST, LEVL IV, 30-39 MIN: ICD-10-PCS | Mod: S$GLB,,, | Performed by: INTERNAL MEDICINE

## 2021-12-02 RX ORDER — CYANOCOBALAMIN 1000 UG/ML
INJECTION, SOLUTION INTRAMUSCULAR; SUBCUTANEOUS
Qty: 6 ML | Refills: 4 | Status: SHIPPED | OUTPATIENT
Start: 2021-12-02

## 2021-12-02 RX ORDER — TOPIRAMATE 100 MG/1
50 TABLET, FILM COATED ORAL DAILY
COMMUNITY
Start: 2021-09-07 | End: 2022-09-29

## 2022-03-21 ENCOUNTER — LAB VISIT (OUTPATIENT)
Dept: LAB | Facility: HOSPITAL | Age: 47
End: 2022-03-21
Attending: INTERNAL MEDICINE
Payer: COMMERCIAL

## 2022-03-21 DIAGNOSIS — D51.8 MACROCYTIC ANEMIA WITH VITAMIN B12 DEFICIENCY: ICD-10-CM

## 2022-03-21 DIAGNOSIS — D75.1 POLYCYTHEMIA: ICD-10-CM

## 2022-03-21 DIAGNOSIS — D50.9 IRON DEFICIENCY ANEMIA, UNSPECIFIED IRON DEFICIENCY ANEMIA TYPE: ICD-10-CM

## 2022-03-21 LAB
BASOPHILS # BLD AUTO: 0.04 K/UL (ref 0–0.2)
BASOPHILS NFR BLD: 0.4 % (ref 0–1.9)
DIFFERENTIAL METHOD: ABNORMAL
EOSINOPHIL # BLD AUTO: 0.1 K/UL (ref 0–0.5)
EOSINOPHIL NFR BLD: 1.2 % (ref 0–8)
ERYTHROCYTE [DISTWIDTH] IN BLOOD BY AUTOMATED COUNT: 12.3 % (ref 11.5–14.5)
FERRITIN SERPL-MCNC: 76 NG/ML (ref 20–300)
HCT VFR BLD AUTO: 47.1 % (ref 37–48.5)
HGB BLD-MCNC: 15.1 G/DL (ref 12–16)
IMM GRANULOCYTES # BLD AUTO: 0.05 K/UL (ref 0–0.04)
IMM GRANULOCYTES NFR BLD AUTO: 0.5 % (ref 0–0.5)
LYMPHOCYTES # BLD AUTO: 1.7 K/UL (ref 1–4.8)
LYMPHOCYTES NFR BLD: 16.7 % (ref 18–48)
MCH RBC QN AUTO: 30.9 PG (ref 27–31)
MCHC RBC AUTO-ENTMCNC: 32.1 G/DL (ref 32–36)
MCV RBC AUTO: 97 FL (ref 82–98)
MONOCYTES # BLD AUTO: 0.7 K/UL (ref 0.3–1)
MONOCYTES NFR BLD: 6.8 % (ref 4–15)
NEUTROPHILS # BLD AUTO: 7.5 K/UL (ref 1.8–7.7)
NEUTROPHILS NFR BLD: 74.4 % (ref 38–73)
NRBC BLD-RTO: 0 /100 WBC
PLATELET # BLD AUTO: 145 K/UL (ref 150–450)
PMV BLD AUTO: 10.2 FL (ref 9.2–12.9)
RBC # BLD AUTO: 4.88 M/UL (ref 4–5.4)
VIT B12 SERPL-MCNC: 627 PG/ML (ref 210–950)
WBC # BLD AUTO: 10.09 K/UL (ref 3.9–12.7)

## 2022-03-21 PROCEDURE — 82607 VITAMIN B-12: CPT | Performed by: INTERNAL MEDICINE

## 2022-03-21 PROCEDURE — 82728 ASSAY OF FERRITIN: CPT | Performed by: INTERNAL MEDICINE

## 2022-03-21 PROCEDURE — 85025 COMPLETE CBC W/AUTO DIFF WBC: CPT | Performed by: INTERNAL MEDICINE

## 2022-03-21 PROCEDURE — 36415 COLL VENOUS BLD VENIPUNCTURE: CPT | Performed by: INTERNAL MEDICINE

## 2022-04-05 ENCOUNTER — OFFICE VISIT (OUTPATIENT)
Dept: HEMATOLOGY/ONCOLOGY | Facility: CLINIC | Age: 47
End: 2022-04-05
Payer: COMMERCIAL

## 2022-04-05 VITALS
TEMPERATURE: 98 F | DIASTOLIC BLOOD PRESSURE: 72 MMHG | RESPIRATION RATE: 18 BRPM | BODY MASS INDEX: 35 KG/M2 | SYSTOLIC BLOOD PRESSURE: 124 MMHG | WEIGHT: 205 LBS | HEIGHT: 64 IN | HEART RATE: 74 BPM

## 2022-04-05 DIAGNOSIS — D51.8 ANEMIA OF DECREASED VITAMIN B12 ABSORPTION: ICD-10-CM

## 2022-04-05 DIAGNOSIS — D50.9 IRON DEFICIENCY ANEMIA, UNSPECIFIED IRON DEFICIENCY ANEMIA TYPE: Primary | ICD-10-CM

## 2022-04-05 PROCEDURE — 3078F PR MOST RECENT DIASTOLIC BLOOD PRESSURE < 80 MM HG: ICD-10-PCS | Mod: S$GLB,,, | Performed by: INTERNAL MEDICINE

## 2022-04-05 PROCEDURE — 3008F BODY MASS INDEX DOCD: CPT | Mod: S$GLB,,, | Performed by: INTERNAL MEDICINE

## 2022-04-05 PROCEDURE — 3078F DIAST BP <80 MM HG: CPT | Mod: S$GLB,,, | Performed by: INTERNAL MEDICINE

## 2022-04-05 PROCEDURE — 3074F PR MOST RECENT SYSTOLIC BLOOD PRESSURE < 130 MM HG: ICD-10-PCS | Mod: S$GLB,,, | Performed by: INTERNAL MEDICINE

## 2022-04-05 PROCEDURE — 3008F PR BODY MASS INDEX (BMI) DOCUMENTED: ICD-10-PCS | Mod: S$GLB,,, | Performed by: INTERNAL MEDICINE

## 2022-04-05 PROCEDURE — 3074F SYST BP LT 130 MM HG: CPT | Mod: S$GLB,,, | Performed by: INTERNAL MEDICINE

## 2022-04-05 PROCEDURE — 99214 PR OFFICE/OUTPT VISIT, EST, LEVL IV, 30-39 MIN: ICD-10-PCS | Mod: S$GLB,,, | Performed by: INTERNAL MEDICINE

## 2022-04-05 PROCEDURE — 99214 OFFICE O/P EST MOD 30 MIN: CPT | Mod: S$GLB,,, | Performed by: INTERNAL MEDICINE

## 2022-04-06 NOTE — PROGRESS NOTES
[facility] in office hematology Oncology Subsequent  encounter note    4/5/22    Subjective:      Patient ID:   Helen Freeman  46 y.o. female  1975  Suyapa Zarate MD    Chief Complaint:   Increased blood count    HPI:  46 y.o. female with a 1 year history of polycythemia.  Hemoglobin 17.4, hematocrit 50, with follow-up determination of hemoglobin 15.1 now.  On B 12 injection BIM, and B 12 level increased 316 to 627.  Continue BIM dosing.  Ferritin 59 to 76 now.    I have reviewed several hospitalizations.  On 1 occasion she had a small bowel obstruction.    Hemoglobin and hematocrit were initially increased and on follow-up determination had returned to normal.    It is on another hospitalization she came in for uterine ablation, initial hemoglobin and hematocrit were increased and on repeat determination had returned to normal.    Most recently she was hospitalized with dizziness symptoms, hemoglobin and hematocrit were increased and on follow-up determination were normal.  At that time she was felt to have possible CVA and was seen per Dr. Jason of ENT and Dr. Danyell Jason.    She says that she has had a 20 lb weight gain over the last 2 months.  She denies unexplained fever, she does admit to having night sweat symptoms.    She has been seen per Dr. Jha of Pulmonary who does not feel she has pulmonary hypertension.  She does not carry a diagnosis of sleep apnea syndrome.    There is a concern that she may have multiple sclerosis and she is due to have an MRI of the brain with contrast.  She does have an aunt who had multiple sclerosis.  The patient has had migraine headaches for up to 30 years.  She also carries a diagnosis of renal tubular acidosis and takes bicarbonate long-term.  She has history of Hashimoto's thyroiditis, she presented in a coma x4 days in 2007, she is now hypothyroid and is on thyroid supplements.  RTA may be 2nd to Topamide Rx for Migraine HA management.  No further  evaluation for MS has been done.    She smokes 2 packs per day for approximately 26 years and now has gone down to less than half a pack per day.  She does not drink alcohol with regularity.  She gives history of allergies to codeine manifested by agitation.  And she has been a housewife long-term.    In the past she was told that she had pernicious anemia, she did take B12 injections, but most recently has been on vitamin-D and multivitamins. She has a history of iron deficiency anemia and has been on oral iron supplements in the past.    She has history of HTN, migraine headaches, hyperlipidemia, tachycardia, hypokalemia, menorrhagia and dysmenorrhea and is status post endometrial ablation.    She saw Dr. Jha of pulmonary, ?pulmonary HTN?.    Medications include Ecotrin, biotin, vitamin-D, Synthroid, Antivert, Toprol XL, multivitamin, Maxalt, Crestor, sodium bicarbonate, Topamax, and the Xarelto.    Status post endometrial ablation, total hysterectomy, bilateral tubal ligation, Caesarean section x3.    She is a  5 para 3 miscarriage 2 individual.  She had onset of menses at age 14. She delivered her 1st live child at the maternal age of 21. She has 1 breast biopsy in the past approximately 1 year ago at [facility].  She has a paternal aunt with breast cancer and a maternal uncle with prostate cancer.    Her mother had hypertension, polycythemia, COPD, and is on oxygen.  Her father has thyroid disease, diabetes, heart disease.  Siblings have thyroid disease, hypertension.  Her daughters have migraine headaches and 1 daughter has polycystic ovarian syndrome.      ROS:   GEN: normal without any fever, night sweats or weight loss  HEENT: See HPI  CV: normal with no CP, SOB, PND, GALVAN or orthopnea  PULM: normal with no SOB, cough, hemoptysis, sputum or pleuritic pain  GI: See HPI  : normal with no hematuria, dysuria  BREAST: normal with no mass, discharge, pain  SKIN: normal with no rash,  erythema, bruising, or swelling     Past Medical History:   Diagnosis Date    H/O Hashimoto thyroiditis     Hypertension     Migraine headache     Tachycardia     Thyroid disease      Past Surgical History:   Procedure Laterality Date     SECTION      3 times    ENDOMETRIAL ABLATION      LAPAROSCOPIC SALPINGECTOMY Bilateral 2020    Procedure: SALPINGECTOMY, LAPAROSCOPIC;  Surgeon: Salomon Claros MD;  Location: [facility] OR;  Service: OB/GYN;  Laterality: Bilateral;    LAPAROSCOPIC TOTAL HYSTERECTOMY N/A 2020    Procedure: HYSTERECTOMY, TOTAL, LAPAROSCOPIC;  Surgeon: Salomon Claros MD;  Location: [facility] OR;  Service: OB/GYN;  Laterality: N/A;       Review of patient's allergies indicates:   Allergen Reactions    Codeine      Social History     Socioeconomic History    Marital status:    Tobacco Use    Smoking status: Former Smoker     Packs/day: 1.50     Years: 35.00     Pack years: 52.50     Types: Cigarettes     Quit date: 2021     Years since quittin.7    Smokeless tobacco: Never Used   Substance and Sexual Activity    Alcohol use: Yes     Comment: occ    Drug use: Never    Sexual activity: Yes     Birth control/protection: See Surgical Hx         Current Outpatient Medications:     aspirin (ECOTRIN) 81 MG EC tablet, 81 mg., Disp: , Rfl:     biotin 10,000 mcg TbDL, Take by mouth., Disp: , Rfl:     cyanocobalamin 1,000 mcg/mL injection, Take 1 ml subq q 2 weeks., Disp: 6 mL, Rfl: 4    ergocalciferol, vitamin D2, (VITAMIN D ORAL), Take 5,000 Units by mouth., Disp: , Rfl:     levothyroxine (SYNTHROID) 137 MCG Tab tablet, Take 125 mcg by mouth before breakfast. , Disp: , Rfl:     metoprolol succinate (TOPROL-XL) 25 MG 24 hr tablet, 50 mg. , Disp: , Rfl:     multivitamin (THERAGRAN) per tablet, Take 1 tablet by mouth once daily., Disp: , Rfl:     rizatriptan (MAXALT) 10 MG tablet, as needed. PRN., Disp: , Rfl:     rosuvastatin (CRESTOR) 10 MG tablet, 5 mg. ,  "Disp: , Rfl:     sodium bicarbonate 650 MG tablet, Take 650 mg by mouth 2 (two) times daily. , Disp: , Rfl:     topiramate (TOPAMAX) 100 MG tablet, 100 mg., Disp: , Rfl:     traZODone (DESYREL) 50 MG tablet, , Disp: , Rfl:     meclizine (ANTIVERT) 25 mg tablet, Take 1 tablet (25 mg total) by mouth 3 (three) times daily. (Patient taking differently: Take 25 mg by mouth 3 (three) times daily. Patient not taking.), Disp: 90 tablet, Rfl: 0    syringe with needle (BD TUBERCULIN SYRINGE) 1 mL 27 x 1/2" Syrg, 1 Syringe by Misc.(Non-Drug; Combo Route) route every 28 days. for 3 doses, Disp: 3 Syringe, Rfl: 4    topiramate (TOPAMAX) 50 MG tablet, TAKE ONE TABLET BY MOUTH TWICE DAILY THANK YOU, Disp: , Rfl:           Objective:   Vitals:  Blood pressure 124/72, pulse 74, temperature 98.2 °F (36.8 °C), resp. rate 18, height 5' 4" (1.626 m), weight 93 kg (205 lb).    Physical Examination:   GEN: no apparent distress, comfortable  HEAD: atraumatic and normocephalic  EYES: no pallor, no icterus.  No yuliya complexion.  ENT: OMM, no pharyngeal erythema, external ears WNL; no nasal discharge; no thrush  NECK: no masses, thyroid normal, trachea midline, no LAD/LN's, supple  CV: RRR with no murmur; normal pulse; normal S1 and S2; no pedal edema  CHEST: Normal respiratory effort; CTAB; normal breath sounds; no wheeze or crackles  ABDOM: nontender and nondistended; soft; normal bowel sounds; no rebound/guarding, L/S NP  MUSC/Skeletal: ROM normal; no crepitus; joints normal  EXTREM: no clubbing, cyanosis, inflammation or swelling  SKIN: no rashes, lesions, ulcers, petechiae   : no cvat  NEURO: grossly intact; motor/sensory WNL;  no tremors  PSYCH: normal mood, affect and behavior  LYMPH: normal cervical, supraclavicular, axillary and groin LN's  BREASTS: NT, No D/C, No palpable mass at L or R breasts.      Labs:   Lab Results   Component Value Date    WBC 10.09 03/21/2022    HGB 15.1 03/21/2022    HCT 47.1 03/21/2022    MCV 97 " 03/21/2022     (L) 03/21/2022    CMP  Sodium   Date Value Ref Range Status   06/30/2021 142 136 - 145 mmol/L Final     Potassium   Date Value Ref Range Status   06/30/2021 4.1 3.5 - 5.1 mmol/L Final     Chloride   Date Value Ref Range Status   06/30/2021 116 (H) 95 - 110 mmol/L Final     CO2   Date Value Ref Range Status   06/30/2021 22 (L) 23 - 29 mmol/L Final     Glucose   Date Value Ref Range Status   06/30/2021 86 70 - 110 mg/dL Final     BUN   Date Value Ref Range Status   06/30/2021 19 6 - 20 mg/dL Final     Creatinine   Date Value Ref Range Status   06/30/2021 0.7 0.5 - 1.4 mg/dL Final     Calcium   Date Value Ref Range Status   06/30/2021 8.5 (L) 8.7 - 10.5 mg/dL Final     Total Protein   Date Value Ref Range Status   06/30/2021 5.4 (L) 6.0 - 8.4 g/dL Final     Albumin   Date Value Ref Range Status   06/30/2021 3.4 (L) 3.5 - 5.2 g/dL Final     Total Bilirubin   Date Value Ref Range Status   06/30/2021 0.3 0.1 - 1.0 mg/dL Final     Comment:     For infants and newborns, interpretation of results should be based  on gestational age, weight and in agreement with clinical  observations.    Premature Infant recommended reference ranges:  Up to 24 hours.............<8.0 mg/dL  Up to 48 hours............<12.0 mg/dL  3-5 days..................<15.0 mg/dL  6-29 days.................<15.0 mg/dL       Alkaline Phosphatase   Date Value Ref Range Status   06/30/2021 48 (L) 55 - 135 U/L Final     AST   Date Value Ref Range Status   06/30/2021 10 10 - 40 U/L Final     ALT   Date Value Ref Range Status   06/30/2021 15 10 - 44 U/L Final     Anion Gap   Date Value Ref Range Status   06/30/2021 4 (L) 8 - 16 mmol/L Final     eGFR if    Date Value Ref Range Status   06/30/2021 >60 >60 mL/min/1.73 m^2 Final     eGFR if non    Date Value Ref Range Status   06/30/2021 >60 >60 mL/min/1.73 m^2 Final     Comment:     Calculation used to obtain the estimated glomerular filtration  rate (eGFR) is  the CKD-EPI equation.        She had colonoscopy and upper endoscopy on April 29, 2021    In reviewing Dr. Zarate's note, from July 8, 2021, she labels her as having pulmonary hypertension, polycythemia probably secondary to smoking history, dizziness of unclear etiology, renal tubular acidosis, migraine headaches, hypothyroidism, anxiety disorder, hyperlipidemia, Hashimoto's thyroiditis her surgery.  Assessment:   (1) 46 y.o. female with intermittent polycythemia with increased hemoglobin and hematocrit, which on repeat determination within the next several days returns to the normal range.    The increased H/H was found during times of hospitalization for small-bowel obstruction, uterine ablation, and most recently for dizziness and weakness symptoms.  Most recent CBC from June 30, 2021 shows a hemoglobin of 14.7, hematocrit 44.6, white blood count of 6420, normal differential, and a platelet count of [id number].     (2)Yes, the polycythemia can be due to intermittent hypoxia 2nd to her long term cigarette smoking.    Alternatively, I believe this is a relative polycythemia 2nd to intermittent reduced plasma  Volume during times of illness or NPO status.  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    She has 1 large kidney, hx of renal tubular acidosis.    If  I can catch her during one of the increased H/H episodes.  Then I would refer her into [facility] main [facility] for   Plasma volume, Total blood volume, RBC mass testing to confirm the Gaisbach's Syndrome Dx.    (3)B 12 at 316, low NL.  The normal range 200-1200 generally.  We try to maintain the   B 12 level > 400.   Continue  B 12 injections to BIM.  B 12 level now 627.    (4)Hx of Fe deficiency anemia, 2009, Ferritin now 76..    RTC 6 months with lab.             Iron deficiency anemia, unspecified iron deficiency anemia type  -     Vitamin B12; Future; Expected date: 09/13/2022  -     Ferritin; Future; Expected date: 09/13/2022  -     CBC Auto  Differential; Future; Expected date: 09/13/2022    Anemia of decreased vitamin B12 absorption  -     Vitamin B12; Future; Expected date: 09/13/2022  -     Ferritin; Future; Expected date: 09/13/2022  -     CBC Auto Differential; Future; Expected date: 09/13/2022      Follow up in about 6 months (around 10/5/2022) for check of blood status after therapy.    I have explained and the patient understands all of  the current recommendation(s). I have answered all of their questions to the best of my ability and to their complete satisfaction.

## 2022-09-13 ENCOUNTER — LAB VISIT (OUTPATIENT)
Dept: LAB | Facility: HOSPITAL | Age: 47
End: 2022-09-13
Attending: SURGERY
Payer: COMMERCIAL

## 2022-09-13 DIAGNOSIS — D51.8 ANEMIA OF DECREASED VITAMIN B12 ABSORPTION: ICD-10-CM

## 2022-09-13 DIAGNOSIS — D50.9 IRON DEFICIENCY ANEMIA, UNSPECIFIED IRON DEFICIENCY ANEMIA TYPE: ICD-10-CM

## 2022-09-13 LAB
BASOPHILS # BLD AUTO: 0.03 K/UL (ref 0–0.2)
BASOPHILS NFR BLD: 0.3 % (ref 0–1.9)
DIFFERENTIAL METHOD: ABNORMAL
EOSINOPHIL # BLD AUTO: 0.1 K/UL (ref 0–0.5)
EOSINOPHIL NFR BLD: 1.2 % (ref 0–8)
ERYTHROCYTE [DISTWIDTH] IN BLOOD BY AUTOMATED COUNT: 13.1 % (ref 11.5–14.5)
FERRITIN SERPL-MCNC: 73 NG/ML (ref 20–300)
HCT VFR BLD AUTO: 44.8 % (ref 37–48.5)
HGB BLD-MCNC: 15.2 G/DL (ref 12–16)
IMM GRANULOCYTES # BLD AUTO: 0.03 K/UL (ref 0–0.04)
IMM GRANULOCYTES NFR BLD AUTO: 0.3 % (ref 0–0.5)
LYMPHOCYTES # BLD AUTO: 1.6 K/UL (ref 1–4.8)
LYMPHOCYTES NFR BLD: 14.2 % (ref 18–48)
MCH RBC QN AUTO: 30.8 PG (ref 27–31)
MCHC RBC AUTO-ENTMCNC: 33.9 G/DL (ref 32–36)
MCV RBC AUTO: 91 FL (ref 82–98)
MONOCYTES # BLD AUTO: 0.6 K/UL (ref 0.3–1)
MONOCYTES NFR BLD: 5.7 % (ref 4–15)
NEUTROPHILS # BLD AUTO: 8.6 K/UL (ref 1.8–7.7)
NEUTROPHILS NFR BLD: 78.3 % (ref 38–73)
NRBC BLD-RTO: 0 /100 WBC
PLATELET # BLD AUTO: 159 K/UL (ref 150–450)
PMV BLD AUTO: 9.8 FL (ref 9.2–12.9)
RBC # BLD AUTO: 4.93 M/UL (ref 4–5.4)
VIT B12 SERPL-MCNC: 660 PG/ML (ref 210–950)
WBC # BLD AUTO: 10.96 K/UL (ref 3.9–12.7)

## 2022-09-13 PROCEDURE — 82607 VITAMIN B-12: CPT | Performed by: INTERNAL MEDICINE

## 2022-09-13 PROCEDURE — 82728 ASSAY OF FERRITIN: CPT | Performed by: INTERNAL MEDICINE

## 2022-09-13 PROCEDURE — 36415 COLL VENOUS BLD VENIPUNCTURE: CPT | Performed by: INTERNAL MEDICINE

## 2022-09-13 PROCEDURE — 85025 COMPLETE CBC W/AUTO DIFF WBC: CPT | Performed by: INTERNAL MEDICINE

## 2022-10-11 ENCOUNTER — OFFICE VISIT (OUTPATIENT)
Dept: HEMATOLOGY/ONCOLOGY | Facility: CLINIC | Age: 47
End: 2022-10-11
Payer: COMMERCIAL

## 2022-10-11 VITALS
HEART RATE: 85 BPM | WEIGHT: 210 LBS | SYSTOLIC BLOOD PRESSURE: 135 MMHG | DIASTOLIC BLOOD PRESSURE: 86 MMHG | TEMPERATURE: 98 F | HEIGHT: 64 IN | RESPIRATION RATE: 18 BRPM | BODY MASS INDEX: 35.85 KG/M2

## 2022-10-11 DIAGNOSIS — D51.8 VITAMIN B12 DEFICIENCY (DIETARY) ANEMIA: ICD-10-CM

## 2022-10-11 DIAGNOSIS — D50.0 IRON DEFICIENCY ANEMIA DUE TO CHRONIC BLOOD LOSS: Primary | ICD-10-CM

## 2022-10-11 PROCEDURE — 3079F DIAST BP 80-89 MM HG: CPT | Mod: CPTII,S$GLB,, | Performed by: INTERNAL MEDICINE

## 2022-10-11 PROCEDURE — 3079F PR MOST RECENT DIASTOLIC BLOOD PRESSURE 80-89 MM HG: ICD-10-PCS | Mod: CPTII,S$GLB,, | Performed by: INTERNAL MEDICINE

## 2022-10-11 PROCEDURE — 1159F PR MEDICATION LIST DOCUMENTED IN MEDICAL RECORD: ICD-10-PCS | Mod: CPTII,S$GLB,, | Performed by: INTERNAL MEDICINE

## 2022-10-11 PROCEDURE — 3075F PR MOST RECENT SYSTOLIC BLOOD PRESS GE 130-139MM HG: ICD-10-PCS | Mod: CPTII,S$GLB,, | Performed by: INTERNAL MEDICINE

## 2022-10-11 PROCEDURE — 1159F MED LIST DOCD IN RCRD: CPT | Mod: CPTII,S$GLB,, | Performed by: INTERNAL MEDICINE

## 2022-10-11 PROCEDURE — 99214 OFFICE O/P EST MOD 30 MIN: CPT | Mod: S$GLB,,, | Performed by: INTERNAL MEDICINE

## 2022-10-11 PROCEDURE — 99214 PR OFFICE/OUTPT VISIT, EST, LEVL IV, 30-39 MIN: ICD-10-PCS | Mod: S$GLB,,, | Performed by: INTERNAL MEDICINE

## 2022-10-11 PROCEDURE — 3075F SYST BP GE 130 - 139MM HG: CPT | Mod: CPTII,S$GLB,, | Performed by: INTERNAL MEDICINE

## 2022-10-24 NOTE — PROGRESS NOTES
[facility] in office hematology Oncology Subsequent  encounter note    10/11/22    Subjective:      Patient ID:   Helen Freeman  46 y.o. female  1975  MD Hyacinth Forrest MD    Chief Complaint:   Increased blood count    HPI:  46 y.o. female with a 1 year history of polycythemia.  Hemoglobin 17.4, hematocrit 50, with follow-up determination of hemoglobin 15.1 now.  On B 12 injection BIM, and B 12 level increased 316 to 627.  Continue BIM dosing.  Ferritin 59 to 76 now.    I have reviewed several hospitalizations.  On 1 occasion she had a small bowel obstruction.    Hemoglobin and hematocrit were initially increased and on follow-up determination had returned to normal.    It is on another hospitalization she came in for uterine ablation, initial hemoglobin and hematocrit were increased and on repeat determination had returned to normal.    Most recently she was hospitalized with dizziness symptoms, hemoglobin and hematocrit were increased and on follow-up determination were normal.  At that time she was felt to have possible CVA and was seen per Dr. Jason of ENT and Dr. Danyell Jason.    She says that she has had a 20 lb weight gain over the last 2 months.  She denies unexplained fever, she does admit to having night sweat symptoms.    She has been seen per Dr. Jha of Pulmonary who does not feel she has pulmonary hypertension.  She does not carry a diagnosis of sleep apnea syndrome.    There is a concern that she may have multiple sclerosis and she is due to have an MRI of the brain with contrast.  She does have an aunt who had multiple sclerosis.  The patient has had migraine headaches for up to 30 years.  She also carries a diagnosis of renal tubular acidosis and takes bicarbonate long-term.  She has history of Hashimoto's thyroiditis, she presented in a coma x4 days in 2007, she is now hypothyroid and is on thyroid supplements.  RTA may be 2nd to Topamide Rx for Migraine HA management.  No  further evaluation for MS has been done.    She smokes 2 packs per day for approximately 26 years and now has gone down to less than half a pack per day.  She does not drink alcohol with regularity.  She gives history of allergies to codeine manifested by agitation.  And she has been a housewife long-term.    In the past she was told that she had pernicious anemia, she did take B12 injections, but most recently has been on vitamin-D and multivitamins. She has a history of iron deficiency anemia and has been on oral iron supplements in the past.    She has history of HTN, migraine headaches, hyperlipidemia, tachycardia, hypokalemia, menorrhagia and dysmenorrhea and is status post endometrial ablation.    She saw Dr. Jha of pulmonary, ?pulmonary HTN?.    Medications include Ecotrin, biotin, vitamin-D, Synthroid, Antivert, Toprol XL, multivitamin, Maxalt, Crestor, sodium bicarbonate, Topamax, and the Xarelto.    Status post endometrial ablation, total hysterectomy, bilateral tubal ligation, Caesarean section x3.    She is a  5 para 3 miscarriage 2 individual.  She had onset of menses at age 14. She delivered her 1st live child at the maternal age of 21. She has 1 breast biopsy in the past approximately 1 year ago at [facility].  She has a paternal aunt with breast cancer and a maternal uncle with prostate cancer.    She has been found to have ovarian cyst., monthly pain.  45 y/o.  Mom went through menopause in late 50's.  Pt has hx of partial hysterectomy, uterine ablation.  BrCa 1 & 2 negative. NTHL1 positive.  Consider a trial of goserilen for sx control.    Her mother had hypertension, polycythemia, COPD, and is on oxygen.  Her father has thyroid disease, diabetes, heart disease.  Siblings have thyroid disease, hypertension.  Her daughters have migraine headaches and 1 daughter has polycystic ovarian syndrome.      ROS:   GEN: normal without any fever, night sweats or weight loss  HEENT: See  HPI  CV: normal with no CP, SOB, PND, GALVAN or orthopnea  PULM: normal with no SOB, cough, hemoptysis, sputum or pleuritic pain  GI: See HPI  : normal with no hematuria, dysuria  BREAST: normal with no mass, discharge, pain  SKIN: normal with no rash, erythema, bruising, or swelling     Past Medical History:   Diagnosis Date    H/O Hashimoto thyroiditis     Hypertension     Migraine headache     Polycythemia     Tachycardia     Thyroid disease      Past Surgical History:   Procedure Laterality Date     SECTION      3 times    ENDOMETRIAL ABLATION      HYSTERECTOMY      LAPAROSCOPIC SALPINGECTOMY Bilateral 2020    Procedure: SALPINGECTOMY, LAPAROSCOPIC;  Surgeon: Salomon Claros MD;  Location: [facility] OR;  Service: OB/GYN;  Laterality: Bilateral;    LAPAROSCOPIC TOTAL HYSTERECTOMY N/A 2020    Procedure: HYSTERECTOMY, TOTAL, LAPAROSCOPIC;  Surgeon: Salomon Claros MD;  Location: [facility] OR;  Service: OB/GYN;  Laterality: N/A;       Review of patient's allergies indicates:   Allergen Reactions    Codeine      Social History     Socioeconomic History    Marital status:    Tobacco Use    Smoking status: Every Day     Packs/day: 0.50     Years: 35.00     Pack years: 17.50     Types: Cigarettes     Last attempt to quit: 2021     Years since quittin.2    Smokeless tobacco: Never   Substance and Sexual Activity    Alcohol use: Yes     Comment: occ    Drug use: Never    Sexual activity: Yes     Partners: Male     Birth control/protection: See Surgical Hx         Current Outpatient Medications:     aspirin (ECOTRIN) 81 MG EC tablet, 81 mg., Disp: , Rfl:     biotin 10,000 mcg TbDL, Take by mouth., Disp: , Rfl:     cyanocobalamin 1,000 mcg/mL injection, Take 1 ml subq q 2 weeks., Disp: 6 mL, Rfl: 4    ergocalciferol, vitamin D2, (VITAMIN D ORAL), Take 5,000 Units by mouth., Disp: , Rfl:     levothyroxine (SYNTHROID) 125 MCG tablet, Take 1 tablet by mouth before evening meal., Disp: , Rfl:      "metoprolol succinate (TOPROL-XL) 50 MG 24 hr tablet, Take 1 tablet by mouth before evening meal., Disp: , Rfl:     multivitamin (THERAGRAN) per tablet, Take 1 tablet by mouth once daily., Disp: , Rfl:     rizatriptan (MAXALT) 10 MG tablet, as needed. PRN., Disp: , Rfl:     rosuvastatin (CRESTOR) 5 MG tablet, Take 1 tablet by mouth before evening meal., Disp: , Rfl:     sodium bicarbonate 650 MG tablet, Take 650 mg by mouth 2 (two) times daily. , Disp: , Rfl:     traZODone (DESYREL) 50 MG tablet, , Disp: , Rfl:     syringe with needle (BD TUBERCULIN SYRINGE) 1 mL 27 x 1/2" Syrg, 1 Syringe by Misc.(Non-Drug; Combo Route) route every 28 days. for 3 doses, Disp: 3 Syringe, Rfl: 4          Objective:   Vitals:  Blood pressure 135/86, pulse 85, temperature 98.1 °F (36.7 °C), resp. rate 18, height 5' 4" (1.626 m), weight 95.3 kg (210 lb).    Physical Examination:   GEN: no apparent distress, comfortable  HEAD: atraumatic and normocephalic  EYES: no pallor, no icterus.  No yuliya complexion.  ENT: OMM, no pharyngeal erythema, external ears WNL; no nasal discharge; no thrush  NECK: no masses, thyroid normal, trachea midline, no LAD/LN's, supple  CV: RRR with no murmur; normal pulse; normal S1 and S2; no pedal edema  CHEST: Normal respiratory effort; CTAB; normal breath sounds; no wheeze or crackles  ABDOM: nontender and nondistended; soft; normal bowel sounds; no rebound/guarding, L/S NP  MUSC/Skeletal: ROM normal; no crepitus; joints normal  EXTREM: no clubbing, cyanosis, inflammation or swelling  SKIN: no rashes, lesions, ulcers, petechiae   : no cvat  NEURO: grossly intact; motor/sensory WNL;  no tremors  PSYCH: normal mood, affect and behavior  LYMPH: normal cervical, supraclavicular, axillary and groin LN's  BREASTS: NT, No D/C, No palpable mass at L or R breasts.      Labs:   Lab Results   Component Value Date    WBC 10.96 09/13/2022    HGB 15.2 09/13/2022    HCT 44.8 09/13/2022    MCV 91 09/13/2022     " 09/13/2022    CMP  Sodium   Date Value Ref Range Status   06/30/2021 142 136 - 145 mmol/L Final     Potassium   Date Value Ref Range Status   06/30/2021 4.1 3.5 - 5.1 mmol/L Final     Chloride   Date Value Ref Range Status   06/30/2021 116 (H) 95 - 110 mmol/L Final     CO2   Date Value Ref Range Status   06/30/2021 22 (L) 23 - 29 mmol/L Final     Glucose   Date Value Ref Range Status   06/30/2021 86 70 - 110 mg/dL Final     BUN   Date Value Ref Range Status   06/30/2021 19 6 - 20 mg/dL Final     Creatinine   Date Value Ref Range Status   06/30/2021 0.7 0.5 - 1.4 mg/dL Final     Calcium   Date Value Ref Range Status   06/30/2021 8.5 (L) 8.7 - 10.5 mg/dL Final     Total Protein   Date Value Ref Range Status   06/30/2021 5.4 (L) 6.0 - 8.4 g/dL Final     Albumin   Date Value Ref Range Status   06/30/2021 3.4 (L) 3.5 - 5.2 g/dL Final     Total Bilirubin   Date Value Ref Range Status   06/30/2021 0.3 0.1 - 1.0 mg/dL Final     Comment:     For infants and newborns, interpretation of results should be based  on gestational age, weight and in agreement with clinical  observations.    Premature Infant recommended reference ranges:  Up to 24 hours.............<8.0 mg/dL  Up to 48 hours............<12.0 mg/dL  3-5 days..................<15.0 mg/dL  6-29 days.................<15.0 mg/dL       Alkaline Phosphatase   Date Value Ref Range Status   06/30/2021 48 (L) 55 - 135 U/L Final     AST   Date Value Ref Range Status   06/30/2021 10 10 - 40 U/L Final     ALT   Date Value Ref Range Status   06/30/2021 15 10 - 44 U/L Final     Anion Gap   Date Value Ref Range Status   06/30/2021 4 (L) 8 - 16 mmol/L Final     eGFR if    Date Value Ref Range Status   06/30/2021 >60 >60 mL/min/1.73 m^2 Final     eGFR if non    Date Value Ref Range Status   06/30/2021 >60 >60 mL/min/1.73 m^2 Final     Comment:     Calculation used to obtain the estimated glomerular filtration  rate (eGFR) is the CKD-EPI equation.       Hgb 15.1, Ferritin 73, B 12 660.        She had colonoscopy and upper endoscopy on April 29, 2021    In reviewing Dr. Zarate's note, from July 8, 2021, she labels her as having pulmonary hypertension, polycythemia probably secondary to smoking history, dizziness of unclear etiology, renal tubular acidosis, migraine headaches, hypothyroidism, anxiety disorder, hyperlipidemia, Hashimoto's thyroiditis her surgery.    Per Dr. Jha no pulmonary HTN.  Assessment:   (1) 46 y.o. female with intermittent polycythemia with increased hemoglobin and hematocrit, which on repeat determination within the next several days returns to the normal range.    The increased H/H was found during times of hospitalization for small-bowel obstruction, uterine ablation, and most recently for dizziness and weakness symptoms.  Most recent CBC from June 30, 2021 shows a hemoglobin of 14.7, hematocrit 44.6, white blood count of 6420, normal differential, and a platelet count of 124720.     (2)Yes, the polycythemia can be due to intermittent hypoxia 2nd to her long term cigarette smoking.    Alternatively, I believe this is a relative polycythemia 2nd to intermittent reduced plasma  Volume during times of illness or NPO status.  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    She has 1 large kidney, hx of renal tubular acidosis.    If  I can catch her during one of the increased H/H episodes.  Then I would refer her into [facility] main campus for   Plasma volume, Total blood volume, RBC mass testing to confirm the Gaisbach's Syndrome Dx.    (3)B 12 at 316, low NL.  The normal range 200-1200 generally.  We try to maintain the   B 12 level > 400.   Continue  B 12 injections to BIM.  B 12 level now 627.    (4)Hx of Fe deficiency anemia, 2009, Ferritin now 76..    RTC 6 months with lab.  CBC, B 12, ferritin [facility]             There are no diagnoses linked to this encounter.    No follow-ups on file.    I have explained and the patient  understands all of  the current recommendation(s). I have answered all of their questions to the best of my ability and to their complete satisfaction.

## 2023-04-06 ENCOUNTER — LAB VISIT (OUTPATIENT)
Dept: LAB | Facility: HOSPITAL | Age: 48
End: 2023-04-06
Attending: SURGERY
Payer: COMMERCIAL

## 2023-04-06 DIAGNOSIS — D50.0 IRON DEFICIENCY ANEMIA SECONDARY TO BLOOD LOSS (CHRONIC): ICD-10-CM

## 2023-04-06 DIAGNOSIS — D51.8 OTHER VITAMIN B12 DEFICIENCY ANEMIA: Primary | ICD-10-CM

## 2023-04-06 LAB
BASOPHILS # BLD AUTO: 0.03 K/UL (ref 0–0.2)
BASOPHILS NFR BLD: 0.3 % (ref 0–1.9)
DIFFERENTIAL METHOD: ABNORMAL
EOSINOPHIL # BLD AUTO: 0.1 K/UL (ref 0–0.5)
EOSINOPHIL NFR BLD: 1.1 % (ref 0–8)
ERYTHROCYTE [DISTWIDTH] IN BLOOD BY AUTOMATED COUNT: 12.8 % (ref 11.5–14.5)
HCT VFR BLD AUTO: 45.6 % (ref 37–48.5)
HGB BLD-MCNC: 15.5 G/DL (ref 12–16)
IMM GRANULOCYTES # BLD AUTO: 0.03 K/UL (ref 0–0.04)
IMM GRANULOCYTES NFR BLD AUTO: 0.3 % (ref 0–0.5)
LYMPHOCYTES # BLD AUTO: 1.6 K/UL (ref 1–4.8)
LYMPHOCYTES NFR BLD: 17.2 % (ref 18–48)
MCH RBC QN AUTO: 30.5 PG (ref 27–31)
MCHC RBC AUTO-ENTMCNC: 34 G/DL (ref 32–36)
MCV RBC AUTO: 90 FL (ref 82–98)
MONOCYTES # BLD AUTO: 0.6 K/UL (ref 0.3–1)
MONOCYTES NFR BLD: 6.3 % (ref 4–15)
NEUTROPHILS # BLD AUTO: 6.7 K/UL (ref 1.8–7.7)
NEUTROPHILS NFR BLD: 74.8 % (ref 38–73)
NRBC BLD-RTO: 0 /100 WBC
PLATELET # BLD AUTO: 171 K/UL (ref 150–450)
PMV BLD AUTO: 10.2 FL (ref 9.2–12.9)
RBC # BLD AUTO: 5.08 M/UL (ref 4–5.4)
WBC # BLD AUTO: 8.99 K/UL (ref 3.9–12.7)

## 2023-04-06 PROCEDURE — 36415 COLL VENOUS BLD VENIPUNCTURE: CPT | Performed by: SURGERY

## 2023-04-06 PROCEDURE — 82728 ASSAY OF FERRITIN: CPT | Performed by: SURGERY

## 2023-04-06 PROCEDURE — 82607 VITAMIN B-12: CPT | Performed by: SURGERY

## 2023-04-06 PROCEDURE — 85025 COMPLETE CBC W/AUTO DIFF WBC: CPT | Performed by: SURGERY

## 2023-04-07 LAB
FERRITIN SERPL-MCNC: 72 NG/ML (ref 20–300)
VIT B12 SERPL-MCNC: 728 PG/ML (ref 210–950)

## 2024-05-20 ENCOUNTER — HOSPITAL ENCOUNTER (OUTPATIENT)
Dept: RADIOLOGY | Facility: HOSPITAL | Age: 49
Discharge: HOME | End: 2024-05-20
Attending: PHYSICIAN ASSISTANT
Payer: COMMERCIAL

## 2024-05-20 DIAGNOSIS — S93.691A SPRING LIGAMENT TEAR, RIGHT, INITIAL ENCOUNTER: ICD-10-CM

## 2024-05-20 DIAGNOSIS — M21.6X1 ACQUIRED EXTERNAL ROTATION OF FOOT, RIGHT: ICD-10-CM

## 2024-05-20 DIAGNOSIS — M84.30XA STRESS FRACTURE: ICD-10-CM

## 2024-05-20 PROCEDURE — 73721 MRI JNT OF LWR EXTRE W/O DYE: CPT | Mod: 26,RT,, | Performed by: RADIOLOGY

## 2024-05-20 PROCEDURE — 73721 MRI JNT OF LWR EXTRE W/O DYE: CPT | Mod: TC,RT

## (undated) DEVICE — CANISTER SUCTION 3000CC

## (undated) DEVICE — UNDERGLOVE BIOGEL PI SZ 6.5 LF

## (undated) DEVICE — SOL IRR NACL .9% 3000ML

## (undated) DEVICE — TROCAR ENDO Z THREAD KII 5X100

## (undated) DEVICE — DRAPE ABDOMINAL TIBURON 14X11

## (undated) DEVICE — PAD PREP 50/CA

## (undated) DEVICE — NDL INSUF ULTRA VERESS 120MM

## (undated) DEVICE — SEE MEDLINE ITEM 157116

## (undated) DEVICE — SUT CTD VICRYL 4-0 BR PS-2

## (undated) DEVICE — SEE MEDLINE ITEM 157181

## (undated) DEVICE — TUBING HEATED INSUFFLATOR

## (undated) DEVICE — SUT 2-0 VICRYL / CT-1

## (undated) DEVICE — SUT 0 VICRYL / UR6 (J603)

## (undated) DEVICE — ADHESIVE DERMABOND ADVANCED

## (undated) DEVICE — GLOVE SURG ULTRA TOUCH 7

## (undated) DEVICE — LEGGING CLEAR POLY 2/PACK

## (undated) DEVICE — SEE L#152161

## (undated) DEVICE — SOL CLEARIFY VISUALIZATION LAP

## (undated) DEVICE — Device

## (undated) DEVICE — CANNULA LAP SEAL Z THRD 5X100

## (undated) DEVICE — SEALER LIGASURE LAP 37CM 5MM

## (undated) DEVICE — HEMOSTAT SURGICEL PWD 3G

## (undated) DEVICE — TRAY DRY SKIN SCRUB PREP

## (undated) DEVICE — SUT CTD VICRYL 0 VIL BR/CT

## (undated) DEVICE — SOL 9P NACL IRR PIC IL

## (undated) DEVICE — ELECTRODE REM PLYHSV RETURN 9

## (undated) DEVICE — NDL ECLIPSE SAFETY 18GX1-1/2IN

## (undated) DEVICE — SEE MEDLINE ITEM 156964

## (undated) DEVICE — IRRIGATOR SUCTION W/TIP

## (undated) DEVICE — SPATULA CURVED 33CM HC BLACK

## (undated) DEVICE — TROCAR ENDOPATH XCEL 11MM 10CM

## (undated) DEVICE — PACK DRAPE PERI/GYN TIBURON

## (undated) DEVICE — PAD SANITARY OB STERILE

## (undated) DEVICE — GLOVE SURGEONS ULTRA TOUCH 6.5

## (undated) DEVICE — SLEEVE SCD EXPRESS CALF MEDIUM

## (undated) DEVICE — DEVICE FORNISEE 35MM CERV CUP

## (undated) DEVICE — SYR 50ML CATH TIP

## (undated) DEVICE — UNDERGLOVES BIOGEL PI SZ 7 LF